# Patient Record
Sex: FEMALE | Race: WHITE | NOT HISPANIC OR LATINO | Employment: FULL TIME | URBAN - METROPOLITAN AREA
[De-identification: names, ages, dates, MRNs, and addresses within clinical notes are randomized per-mention and may not be internally consistent; named-entity substitution may affect disease eponyms.]

---

## 2020-09-08 ENCOUNTER — NURSE TRIAGE (OUTPATIENT)
Dept: ADMINISTRATIVE | Facility: CLINIC | Age: 30
End: 2020-09-08

## 2020-09-09 ENCOUNTER — HOSPITAL ENCOUNTER (INPATIENT)
Facility: HOSPITAL | Age: 30
LOS: 1 days | Discharge: HOME | DRG: 948 | End: 2020-09-12
Attending: EMERGENCY MEDICINE | Admitting: INTERNAL MEDICINE
Payer: COMMERCIAL

## 2020-09-09 ENCOUNTER — OFFICE VISIT (OUTPATIENT)
Dept: URGENT CARE | Facility: CLINIC | Age: 30
End: 2020-09-09
Payer: COMMERCIAL

## 2020-09-09 VITALS
TEMPERATURE: 97 F | DIASTOLIC BLOOD PRESSURE: 70 MMHG | HEART RATE: 114 BPM | SYSTOLIC BLOOD PRESSURE: 104 MMHG | OXYGEN SATURATION: 97 % | HEIGHT: 62 IN | BODY MASS INDEX: 31.28 KG/M2 | RESPIRATION RATE: 12 BRPM | WEIGHT: 170 LBS

## 2020-09-09 DIAGNOSIS — R07.9 CHEST PAIN: ICD-10-CM

## 2020-09-09 DIAGNOSIS — E86.0 DEHYDRATION: ICD-10-CM

## 2020-09-09 DIAGNOSIS — R10.31 RLQ ABDOMINAL PAIN: ICD-10-CM

## 2020-09-09 DIAGNOSIS — R50.9 FEBRILE ILLNESS: ICD-10-CM

## 2020-09-09 DIAGNOSIS — R50.9 FEVER OF UNKNOWN ORIGIN: ICD-10-CM

## 2020-09-09 DIAGNOSIS — R11.0 NAUSEA: ICD-10-CM

## 2020-09-09 DIAGNOSIS — R74.01 TRANSAMINITIS: ICD-10-CM

## 2020-09-09 DIAGNOSIS — R11.2 NON-INTRACTABLE VOMITING WITH NAUSEA, UNSPECIFIED VOMITING TYPE: ICD-10-CM

## 2020-09-09 DIAGNOSIS — R11.10 VOMITING, INTRACTABILITY OF VOMITING NOT SPECIFIED, PRESENCE OF NAUSEA NOT SPECIFIED, UNSPECIFIED VOMITING TYPE: Primary | ICD-10-CM

## 2020-09-09 DIAGNOSIS — D72.820 LYMPHOCYTOSIS: Primary | ICD-10-CM

## 2020-09-09 LAB
ALBUMIN SERPL BCP-MCNC: 3.3 G/DL (ref 3.5–5.2)
ALP SERPL-CCNC: 341 U/L (ref 55–135)
ALT SERPL W/O P-5'-P-CCNC: 671 U/L (ref 10–44)
ANION GAP SERPL CALC-SCNC: 9 MMOL/L (ref 8–16)
ANISOCYTOSIS BLD QL SMEAR: SLIGHT
AST SERPL-CCNC: 325 U/L (ref 10–40)
B-HCG UR QL: NEGATIVE
BACTERIA #/AREA URNS AUTO: NORMAL /HPF
BASOPHILS # BLD AUTO: ABNORMAL K/UL (ref 0–0.2)
BASOPHILS NFR BLD: 0 % (ref 0–1.9)
BILIRUB SERPL-MCNC: 0.8 MG/DL (ref 0.1–1)
BILIRUB UR QL STRIP: NEGATIVE
BUN SERPL-MCNC: 9 MG/DL (ref 6–20)
CALCIUM SERPL-MCNC: 8.9 MG/DL (ref 8.7–10.5)
CHLORIDE SERPL-SCNC: 102 MMOL/L (ref 95–110)
CLARITY UR REFRACT.AUTO: CLEAR
CO2 SERPL-SCNC: 23 MMOL/L (ref 23–29)
COLOR UR AUTO: YELLOW
CREAT SERPL-MCNC: 0.8 MG/DL (ref 0.5–1.4)
CTP QC/QA: YES
CTP QC/QA: YES
DACRYOCYTES BLD QL SMEAR: ABNORMAL
DIFFERENTIAL METHOD: ABNORMAL
EOSINOPHIL # BLD AUTO: ABNORMAL K/UL (ref 0–0.5)
EOSINOPHIL NFR BLD: 0 % (ref 0–8)
ERYTHROCYTE [DISTWIDTH] IN BLOOD BY AUTOMATED COUNT: 13.4 % (ref 11.5–14.5)
EST. GFR  (AFRICAN AMERICAN): >60 ML/MIN/1.73 M^2
EST. GFR  (NON AFRICAN AMERICAN): >60 ML/MIN/1.73 M^2
GLUCOSE SERPL-MCNC: 87 MG/DL (ref 70–110)
GLUCOSE UR QL STRIP: NEGATIVE
HCT VFR BLD AUTO: 44.7 % (ref 37–48.5)
HGB BLD-MCNC: 14.2 G/DL (ref 12–16)
HGB UR QL STRIP: NEGATIVE
HYPOCHROMIA BLD QL SMEAR: ABNORMAL
IMM GRANULOCYTES # BLD AUTO: ABNORMAL K/UL (ref 0–0.04)
IMM GRANULOCYTES NFR BLD AUTO: ABNORMAL % (ref 0–0.5)
KETONES UR QL STRIP: ABNORMAL
LEUKOCYTE ESTERASE UR QL STRIP: ABNORMAL
LIPASE SERPL-CCNC: 25 U/L (ref 4–60)
LYMPHOCYTES # BLD AUTO: ABNORMAL K/UL (ref 1–4.8)
LYMPHOCYTES NFR BLD: 63 % (ref 18–48)
MCH RBC QN AUTO: 28.2 PG (ref 27–31)
MCHC RBC AUTO-ENTMCNC: 31.8 G/DL (ref 32–36)
MCV RBC AUTO: 89 FL (ref 82–98)
METAMYELOCYTES NFR BLD MANUAL: 1 %
MICROSCOPIC COMMENT: NORMAL
MONOCYTES # BLD AUTO: ABNORMAL K/UL (ref 0.3–1)
MONOCYTES NFR BLD: 1 % (ref 4–15)
NEUTROPHILS NFR BLD: 30 % (ref 38–73)
NEUTS BAND NFR BLD MANUAL: 2 %
NITRITE UR QL STRIP: NEGATIVE
NRBC BLD-RTO: 0 /100 WBC
OVALOCYTES BLD QL SMEAR: ABNORMAL
PATH REV BLD -IMP: NORMAL
PH UR STRIP: 6 [PH] (ref 5–8)
PLATELET # BLD AUTO: 250 K/UL (ref 150–350)
PMV BLD AUTO: 10.4 FL (ref 9.2–12.9)
POIKILOCYTOSIS BLD QL SMEAR: SLIGHT
POLYCHROMASIA BLD QL SMEAR: ABNORMAL
POTASSIUM SERPL-SCNC: 3.7 MMOL/L (ref 3.5–5.1)
PROT SERPL-MCNC: 7.1 G/DL (ref 6–8.4)
PROT UR QL STRIP: NEGATIVE
RBC # BLD AUTO: 5.04 M/UL (ref 4–5.4)
RBC #/AREA URNS AUTO: 0 /HPF (ref 0–4)
SARS-COV-2 RDRP RESP QL NAA+PROBE: NEGATIVE
SODIUM SERPL-SCNC: 134 MMOL/L (ref 136–145)
SP GR UR STRIP: 1 (ref 1–1.03)
SQUAMOUS #/AREA URNS AUTO: 1 /HPF
URN SPEC COLLECT METH UR: ABNORMAL
WBC # BLD AUTO: 13.16 K/UL (ref 3.9–12.7)
WBC #/AREA URNS AUTO: 3 /HPF (ref 0–5)
WBC OTHER NFR BLD MANUAL: 3 %

## 2020-09-09 PROCEDURE — 80053 COMPREHEN METABOLIC PANEL: CPT

## 2020-09-09 PROCEDURE — U0002: ICD-10-PCS | Mod: QW,S$GLB,, | Performed by: PHYSICIAN ASSISTANT

## 2020-09-09 PROCEDURE — G0378 HOSPITAL OBSERVATION PER HR: HCPCS

## 2020-09-09 PROCEDURE — 25000003 PHARM REV CODE 250: Performed by: PHYSICIAN ASSISTANT

## 2020-09-09 PROCEDURE — 63600175 PHARM REV CODE 636 W HCPCS: Performed by: PHYSICIAN ASSISTANT

## 2020-09-09 PROCEDURE — 99214 OFFICE O/P EST MOD 30 MIN: CPT | Mod: 25,S$GLB,, | Performed by: PHYSICIAN ASSISTANT

## 2020-09-09 PROCEDURE — 96374 THER/PROPH/DIAG INJ IV PUSH: CPT

## 2020-09-09 PROCEDURE — 99214 PR OFFICE/OUTPT VISIT, EST, LEVL IV, 30-39 MIN: ICD-10-PCS | Mod: 25,S$GLB,, | Performed by: PHYSICIAN ASSISTANT

## 2020-09-09 PROCEDURE — 99285 PR EMERGENCY DEPT VISIT,LEVEL V: ICD-10-PCS | Mod: ,,, | Performed by: PHYSICIAN ASSISTANT

## 2020-09-09 PROCEDURE — 99285 EMERGENCY DEPT VISIT HI MDM: CPT | Mod: 25

## 2020-09-09 PROCEDURE — 86664 EPSTEIN-BARR NUCLEAR ANTIGEN: CPT

## 2020-09-09 PROCEDURE — 99220 PR INITIAL OBSERVATION CARE,LEVL III: ICD-10-PCS | Mod: ,,, | Performed by: NURSE PRACTITIONER

## 2020-09-09 PROCEDURE — 96361 HYDRATE IV INFUSION ADD-ON: CPT

## 2020-09-09 PROCEDURE — 96372 PR INJECTION,THERAP/PROPH/DIAG2ST, IM OR SUBCUT: ICD-10-PCS | Mod: S$GLB,,, | Performed by: PHYSICIAN ASSISTANT

## 2020-09-09 PROCEDURE — 85027 COMPLETE CBC AUTOMATED: CPT

## 2020-09-09 PROCEDURE — 85007 BL SMEAR W/DIFF WBC COUNT: CPT | Mod: NCS

## 2020-09-09 PROCEDURE — 99285 EMERGENCY DEPT VISIT HI MDM: CPT | Mod: ,,, | Performed by: PHYSICIAN ASSISTANT

## 2020-09-09 PROCEDURE — 36415 COLL VENOUS BLD VENIPUNCTURE: CPT

## 2020-09-09 PROCEDURE — 81025 URINE PREGNANCY TEST: CPT | Performed by: PHYSICIAN ASSISTANT

## 2020-09-09 PROCEDURE — 85060 PATHOLOGIST REVIEW: ICD-10-PCS | Mod: ,,, | Performed by: PATHOLOGY

## 2020-09-09 PROCEDURE — 81001 URINALYSIS AUTO W/SCOPE: CPT

## 2020-09-09 PROCEDURE — 99220 PR INITIAL OBSERVATION CARE,LEVL III: CPT | Mod: ,,, | Performed by: NURSE PRACTITIONER

## 2020-09-09 PROCEDURE — 85060 BLOOD SMEAR INTERPRETATION: CPT | Mod: ,,, | Performed by: PATHOLOGY

## 2020-09-09 PROCEDURE — U0002 COVID-19 LAB TEST NON-CDC: HCPCS | Mod: QW,S$GLB,, | Performed by: PHYSICIAN ASSISTANT

## 2020-09-09 PROCEDURE — 80074 ACUTE HEPATITIS PANEL: CPT

## 2020-09-09 PROCEDURE — 83690 ASSAY OF LIPASE: CPT

## 2020-09-09 PROCEDURE — 25000003 PHARM REV CODE 250: Performed by: NURSE PRACTITIONER

## 2020-09-09 PROCEDURE — 96372 THER/PROPH/DIAG INJ SC/IM: CPT | Mod: S$GLB,,, | Performed by: PHYSICIAN ASSISTANT

## 2020-09-09 RX ORDER — TALC
9 POWDER (GRAM) TOPICAL NIGHTLY PRN
Status: DISCONTINUED | OUTPATIENT
Start: 2020-09-09 | End: 2020-09-12 | Stop reason: HOSPADM

## 2020-09-09 RX ORDER — ONDANSETRON 4 MG/1
4 TABLET, ORALLY DISINTEGRATING ORAL
Status: DISCONTINUED | OUTPATIENT
Start: 2020-09-09 | End: 2020-09-09

## 2020-09-09 RX ORDER — LEVONORGESTREL AND ETHINYL ESTRADIOL 0.15-0.03
1 KIT ORAL DAILY
COMMUNITY
End: 2020-09-09

## 2020-09-09 RX ORDER — PROMETHAZINE HYDROCHLORIDE 25 MG/ML
25 INJECTION, SOLUTION INTRAMUSCULAR; INTRAVENOUS
Status: DISCONTINUED | OUTPATIENT
Start: 2020-09-09 | End: 2020-09-09 | Stop reason: HOSPADM

## 2020-09-09 RX ORDER — GLUCAGON 1 MG
1 KIT INJECTION
Status: DISCONTINUED | OUTPATIENT
Start: 2020-09-09 | End: 2020-09-12 | Stop reason: HOSPADM

## 2020-09-09 RX ORDER — IBUPROFEN 200 MG
16 TABLET ORAL
Status: DISCONTINUED | OUTPATIENT
Start: 2020-09-09 | End: 2020-09-12 | Stop reason: HOSPADM

## 2020-09-09 RX ORDER — PROMETHAZINE HYDROCHLORIDE 25 MG/1
25 TABLET ORAL EVERY 6 HOURS PRN
Status: DISCONTINUED | OUTPATIENT
Start: 2020-09-09 | End: 2020-09-12 | Stop reason: HOSPADM

## 2020-09-09 RX ORDER — SODIUM CHLORIDE 0.9 % (FLUSH) 0.9 %
10 SYRINGE (ML) INJECTION
Status: DISCONTINUED | OUTPATIENT
Start: 2020-09-09 | End: 2020-09-12 | Stop reason: HOSPADM

## 2020-09-09 RX ORDER — ACETAMINOPHEN 325 MG/1
650 TABLET ORAL EVERY 4 HOURS PRN
Status: DISCONTINUED | OUTPATIENT
Start: 2020-09-09 | End: 2020-09-12 | Stop reason: HOSPADM

## 2020-09-09 RX ORDER — ONDANSETRON 2 MG/ML
4 INJECTION INTRAMUSCULAR; INTRAVENOUS
Status: COMPLETED | OUTPATIENT
Start: 2020-09-09 | End: 2020-09-09

## 2020-09-09 RX ORDER — LEVONORGESTREL AND ETHINYL ESTRADIOL 0.15-0.03
1 KIT ORAL DAILY
COMMUNITY

## 2020-09-09 RX ORDER — ONDANSETRON 2 MG/ML
4 INJECTION INTRAMUSCULAR; INTRAVENOUS EVERY 8 HOURS PRN
Status: DISCONTINUED | OUTPATIENT
Start: 2020-09-09 | End: 2020-09-12 | Stop reason: HOSPADM

## 2020-09-09 RX ORDER — IBUPROFEN 200 MG
24 TABLET ORAL
Status: DISCONTINUED | OUTPATIENT
Start: 2020-09-09 | End: 2020-09-12 | Stop reason: HOSPADM

## 2020-09-09 RX ADMIN — PROMETHAZINE HYDROCHLORIDE 25 MG: 25 INJECTION, SOLUTION INTRAMUSCULAR; INTRAVENOUS at 11:09

## 2020-09-09 RX ADMIN — ACETAMINOPHEN 650 MG: 325 TABLET ORAL at 10:09

## 2020-09-09 RX ADMIN — SODIUM CHLORIDE 1000 ML: 0.9 INJECTION, SOLUTION INTRAVENOUS at 12:09

## 2020-09-09 RX ADMIN — SODIUM CHLORIDE 1000 ML: 0.9 INJECTION, SOLUTION INTRAVENOUS at 02:09

## 2020-09-09 RX ADMIN — ONDANSETRON 4 MG: 2 INJECTION INTRAMUSCULAR; INTRAVENOUS at 02:09

## 2020-09-09 NOTE — HPI
31 y/o F sent from urgent care with c/o nausea and vomiting past 5 days ago, RUQ tenderness, abdominal discomfort, febrile x 3 days with peak temp of 103.2.  She was noted to have elevated LFT's and lymphocytosis so [place] [facility] [place] work [place].  She is no longer N/V/ febrile.  She did not have diarrhea or constipation.  She had a hard time keeping fluids/ food down, indeterminate weight loss.  Of note, she has only traveled to [place] 9/2/20 of recent, Deanna was in 2016 per notes.  She has not socialized outside of the house and has only ordered take out.  No one else in the home is sick.  She has been sleeping in her brother's room d/t Mold remediation in her bedroom, she denies any upper respiratory symptoms.  Her last BM was yesterday and nml color/ consistency. She reports fatigue and some mild shortness of breath on exertion.  She denies any LH, dizziness, CP, blurred vision, HA, heat/ cold sensitivities, dysuria, frequency, urgency, flank pain. Denies vaginal discharge, irritation.  She reports mild cough, congestion over past few days as well. Had negative covid test.    PMH: Nephrolithiasis, PCOS  Meds: OCP only  Sexually active: uses condoms. LMP 1 week ago lighter than usual. Bled for 1 day. Last nml menstrual cycle was 1 month ago.  LABS: Granulocytes 30, Lymph 63, Mono 1.0,  , , Lipase 25, Alb 3.3, TB 0.8, TP 7.1, HCG negative  Hepatic US: pending read     Admitted to hospital medicine for acute hepatitis of unknown etiology

## 2020-09-09 NOTE — H&P
[facility] Hospital Medicine  History & Physical    Patient Name: Dorothy Sweeney  MRN: [id number]  Admission Date: 9/9/2020  Attending Physician: Demario Roger MD   Primary Care Provider: [facility] Medicine Team: [facility] [place]OSP MED F Eleonora Weller NP     Patient information was obtained from patient, parent, past medical records and ER records.     Subjective:     Principal Problem:Transaminitis    Chief Complaint:   Chief Complaint   Patient presents with    Abdominal Pain     with nausea and vomiting x 5 days. Fever x 4 days, broke yesterday. Sent from urgent care        HPI: 29 y/o F sent from urgent care with c/o nausea and vomiting past 5 days ago, RUQ tenderness, abdominal discomfort, febrile x 3 days with peak temp of 103.2.  She was noted to have elevated LFT's and lymphocytosis so she was transferred to [facility] [place] ED for acute Hepatitis work up.  She is no longer N/V/ febrile.  She did not have diarrhea or constipation.  She had a hard time keeping fluids/ food down, indeterminate weight loss.  Of note, she has only traveled to [place] 9/2/20 of recent, [place] was in 2016 per notes.  She has not socialized outside of the house and has only ordered take out.  No one else in the home is sick.  She has been sleeping in her brother's room d/t Mold remediation in her bedroom, she denies any upper respiratory symptoms.  Her last BM was yesterday and nml color/ consistency. She reports fatigue and some mild shortness of breath on exertion.  She denies any LH, dizziness, CP, blurred vision, HA, heat/ cold sensitivities, dysuria, frequency, urgency, flank pain. Denies vaginal discharge, irritation.  She reports mild cough, congestion over past few days as well. Had negative covid test.    PMH: Nephrolithiasis, PCOS  Meds: OCP only  Sexually active: uses condoms. LMP 1 week ago lighter than usual. Bled for 1 day. Last nml menstrual cycle was 1 month ago.  LABS: Granulocytes 30, Lymph  63, Mono 1.0,  , , Lipase 25, Alb 3.3, TB 0.8, TP 7.1, HCG negative  Hepatic US: pending read     Admitted to hospital medicine for acute hepatitis of unknown etiology     Past Medical History:   Diagnosis Date    PCOS (polycystic ovarian syndrome)        Past Surgical History:   Procedure Laterality Date    TONSILLECTOMY      WRIST SURGERY         Review of patient's allergies indicates:  No Known Allergies    No current facility-administered medications on file prior to encounter.      Current Outpatient Medications on File Prior to Encounter   Medication Sig    levonorgestrel-ethinyl estradiol (LILLOW, 28,) 0.15-0.03 mg per tablet Take 1 tablet by mouth once daily.    drospirenone-ethinyl estradiol (OCELLA) 3-0.03 mg per tablet Take 1 tablet by mouth once daily.    [DISCONTINUED] atovaquone-proguanil (MALARONE) 250-100 mg Tab 1 tab/d starting 2d before travel, 1/d while there and for 7 d after leaving    [DISCONTINUED] levonorgestrel-ethinyl estradiol (NORDETTE) 0.15-0.03 mg per tablet Take 1 tablet by mouth once daily.     Family History     None        Tobacco Use    Smoking status: Never Smoker   Substance and Sexual Activity    Alcohol use: Yes    Drug use: No    Sexual activity: Not on file     Review of Systems   Constitutional: Positive for activity change (fatigue), appetite change (decreased d/t recent N/v]) and fatigue. Negative for chills and fever (broke yesterday).   HENT: Negative for congestion, rhinorrhea, sinus pressure, sore throat and trouble swallowing.    Eyes: Negative for pain, redness and visual disturbance.   Respiratory: Negative for cough, chest tightness, shortness of breath, wheezing and stridor.    Cardiovascular: Negative for chest pain, palpitations and leg swelling.   Gastrointestinal: Positive for abdominal pain (RUQ tenderness), nausea and vomiting. Negative for abdominal distention, blood in stool, constipation and diarrhea.   Endocrine: Negative  for cold intolerance and heat intolerance.   Genitourinary: Negative for difficulty urinating, dysuria, frequency, hematuria and urgency.   Musculoskeletal: Negative for arthralgias, back pain, myalgias and neck pain.   Skin: Negative for color change, pallor and rash.   Allergic/Immunologic: Negative for immunocompromised state.   Neurological: Negative for dizziness, tremors, syncope, weakness, light-headedness, numbness and headaches.   Hematological: Does not bruise/bleed easily.   Psychiatric/Behavioral: Positive for sleep disturbance (though she thinks it's due to sleeping in her brother's bed). Negative for agitation and confusion. The patient is not nervous/anxious.      Objective:     Vital Signs (Most Recent):  Temp: 98.9 °F (37.2 °C) (09/09/20 1742)  Pulse: 91 (09/09/20 1742)  Resp: 17 (09/09/20 1742)  BP: 109/69 (09/09/20 1742)  SpO2: 99 % (09/09/20 1742) Vital Signs (24h Range):  Temp:  [97.4 °F (36.3 °C)-98.9 °F (37.2 °C)] 98.9 °F (37.2 °C)  Pulse:  [] 91  Resp:  [12-20] 17  SpO2:  [97 %-99 %] 99 %  BP: (104-116)/(65-77) 109/69     Weight: 77.1 kg (170 lb)  Body mass index is 31.09 kg/m².    Physical Exam  Vitals signs and nursing note reviewed.   Constitutional:       General: She is not in acute distress.     Appearance: Normal appearance. She is well-developed and normal weight. She is not ill-appearing or toxic-appearing.   HENT:      Head: Normocephalic and atraumatic.      Right Ear: External ear normal.      Left Ear: External ear normal.      Nose: Nose normal.      Mouth/Throat:      Mouth: Mucous membranes are moist.      Pharynx: Oropharynx is clear.   Eyes:      General: No scleral icterus.     Extraocular Movements: Extraocular movements intact.      Conjunctiva/sclera: Conjunctivae normal.   Neck:      Musculoskeletal: Normal range of motion and neck supple.      Thyroid: No thyromegaly.      Vascular: No JVD.      Trachea: No tracheal deviation.   Cardiovascular:      Rate and  Rhythm: Normal rate and regular rhythm.      Pulses: Normal pulses.      Heart sounds: Normal heart sounds. No murmur. No friction rub. No gallop.    Pulmonary:      Effort: Pulmonary effort is normal. No respiratory distress.      Breath sounds: Normal breath sounds. No wheezing or rales.   Abdominal:      General: Bowel sounds are normal. There is no distension.      Palpations: Abdomen is soft. There is no mass.      Tenderness: There is abdominal tenderness (RUQ). There is no guarding or rebound.   Musculoskeletal: Normal range of motion.         General: No swelling or tenderness.   Skin:     General: Skin is warm and dry.      Coloration: Skin is not jaundiced.      Findings: No erythema or rash.   Neurological:      General: No focal deficit present.      Mental Status: She is alert and oriented to person, place, and time. Mental status is at baseline.      Cranial Nerves: No cranial nerve deficit.      Sensory: No sensory deficit.      Motor: No abnormal muscle tone.      Coordination: Coordination normal.   Psychiatric:         Mood and Affect: Mood normal.         Behavior: Behavior normal.         Thought Content: Thought content normal.         Judgment: Judgment normal.             Significant Labs:   CBC:   Recent Labs   Lab 09/09/20  1229   WBC 13.16*   HGB 14.2   HCT 44.7        CMP:   Recent Labs   Lab 09/09/20  1229   *   K 3.7      CO2 23   GLU 87   BUN 9   CREATININE 0.8   CALCIUM 8.9   PROT 7.1   ALBUMIN 3.3*   BILITOT 0.8   ALKPHOS 341*   *   *   ANIONGAP 9   EGFRNONAA >60.0     Lipase:   Recent Labs   Lab 09/09/20  1229   LIPASE 25       Significant Imaging: I have reviewed all pertinent imaging results/findings within the past 24 hours.     RUQ US:   Liver: Enlarged, measuring 19.9 cm. Homogeneous echotexture.  There is a 0.7 x 0.7 x 0.7 cm well-circumscribed hyperechoic lesion in the right hepatic lobe, likely hemangioma.     Gallbladder: There are a few  gallstones measuring up to 2.5 cm.  No wall thickening or pericholecystic fluid.  No sonographic Watters's sign.     Biliary system: The common duct is not dilated, measuring 2 mm.  No intrahepatic ductal dilatation.     Spleen: Enlarged with a homogeneous echotexture, measuring 13.2 x 4.6 cm.     Pancreas: The visualized portions of pancreas appear normal.     Miscellaneous: No ascites.    Assessment/Plan:     * Transaminitis  Acute Hepatitis of unknown origin:   Differential's include: Hep A from take out, Travel to [place], EBV  - Hep panel /EBV pending, consider HIV if neg.  - RuQ US: noted above with HSM, cholelithiasis without choleycystitis  - regular diet  - Monitor LFt's, if continue to rise consult Hepatology   - no excessive tylenol use  - hydrated with 2L NS in ED, no further fluids warranted  - RUQ tenderness on exam      VTE Risk Mitigation (From admission, onward)         Ordered     IP VTE HIGH RISK PATIENT  Once      09/09/20 1720     Place ANDREW hose  Until discontinued      09/09/20 1720                   Eleonora Weller NP  Department of Hospital Medicine   [facility]

## 2020-09-09 NOTE — ED PROVIDER NOTES
Encounter Date: 9/9/2020       History     Chief Complaint   Patient presents with    Abdominal Pain     with nausea and vomiting x 5 days. Fever x 4 days, broke yesterday. Sent from urgent care     Ms Sweeney is a 30yoF who presents for N/V and abdominal discomfort x 5 days; pertinent PMHx PCOS, h/o tonsillectomy, OCP use.  Patient reports onset of viral-type symptoms 5 days ago with myalgias, fever and chills.  Several days later, she developed frequent nausea vomiting with poor p.o. intake.  Regular BMs without melenic stool or hematochezia.  Symptoms are associated with intermittent abdominal discomfort that is poorly localized and absent on current presentation.  She does feel like her heart is beating fast.  She did not tolerate p.o. intake yesterday or today.  Visited her grandfather in [place] last week, no known sick contacts.  Denies fresh/non- treated water exposure, healthcare setting exposure or recent antibiotic use.  No antipyretic PTA, no fever today.  No abdominal SHx, no history of similar symptoms.  Denies chest pain or shortness of breath after vomiting, vaginal discharge or bleeding, dysuria, hematuria, syncope.  The patients available PMH, PSH, Social History, medications, allergies, and triage vital signs were reviewed just prior to their medical evaluation.  A ten point review of systems was completed and is negative except as documented above.  Patient denies any other acute medical complaint.    Please be advised this text was dictated with [employer] software and may contain errors due to translation.           Review of patient's allergies indicates:  No Known Allergies  Past Medical History:   Diagnosis Date    PCOS (polycystic ovarian syndrome)      Past Surgical History:   Procedure Laterality Date    TONSILLECTOMY      WRIST SURGERY       Family History   Problem Relation Age of Onset    Breast cancer Neg Hx     Colon cancer Neg Hx     Hypertension Neg Hx     Ovarian cancer Neg Hx      Stroke Neg Hx      Social History     Tobacco Use    Smoking status: Never Smoker   Substance Use Topics    Alcohol use: Yes    Drug use: No     Review of Systems   Constitutional: Positive for chills, fatigue and fever (resolved).   HENT: Negative for sore throat and trouble swallowing.    Respiratory: Negative for cough, chest tightness and shortness of breath.    Cardiovascular: Positive for palpitations. Negative for chest pain.   Gastrointestinal: Positive for abdominal pain (intermittent, generalized), nausea and vomiting. Negative for abdominal distention, blood in stool, constipation and diarrhea.   Genitourinary: Negative for dysuria, flank pain, frequency, hematuria, vaginal bleeding and vaginal discharge.   Musculoskeletal: Negative for myalgias (improved).   Skin: Negative for pallor and rash.   Neurological: Negative for dizziness, weakness and headaches.   Psychiatric/Behavioral: Negative for confusion.       Physical Exam     Initial Vitals [09/09/20 1154]   BP Pulse Resp Temp SpO2   116/77 (!) 114 20 98.1 °F (36.7 °C) 99 %      MAP       --         Physical Exam    Vitals reviewed.  Constitutional: She appears well-developed and well-nourished. She is not diaphoretic. No distress.   HENT:   Head: Normocephalic and atraumatic.   Right Ear: External ear normal.   Left Ear: External ear normal.   Mouth/Throat: No oropharyngeal exudate.   Dry mucus membranes   Eyes: Conjunctivae and EOM are normal. Pupils are equal, round, and reactive to light. No scleral icterus.   Neck: No JVD present.   Cardiovascular: Normal rate, regular rhythm and intact distal pulses.   Pulmonary/Chest: Breath sounds normal. No respiratory distress. She has no wheezes. She has no rhonchi. She has no rales.   Abdominal: Soft. Bowel sounds are normal. There is no abdominal tenderness. There is no rebound and no guarding.   No flank or CVA TTP   Musculoskeletal: Normal range of motion. No edema.   Neurological: She is alert  and oriented to person, place, and time. She has normal strength. No cranial nerve deficit.   Skin: Skin is warm and dry. Capillary refill takes less than 2 seconds. No rash noted. No erythema. No pallor.   Psychiatric: She has a normal mood and affect. Her behavior is normal. Judgment and thought content normal.         ED Course   Procedures  Labs Reviewed   CBC W/ AUTO DIFFERENTIAL - Abnormal; Notable for the following components:       Result Value    WBC 13.16 (*)     Mean Corpuscular Hemoglobin Conc 31.8 (*)     Gran% 30.0 (*)     Lymph% 63.0 (*)     Mono% 1.0 (*)     All other components within normal limits   COMPREHENSIVE METABOLIC PANEL - Abnormal; Notable for the following components:    Sodium 134 (*)     Albumin 3.3 (*)     Alkaline Phosphatase 341 (*)      (*)      (*)     All other components within normal limits   LIPASE   HEPATITIS PANEL, ACUTE   URINALYSIS, REFLEX TO URINE CULTURE   HEPATITIS PANEL, ACUTE   POCT URINE PREGNANCY          Imaging Results          US Abdomen Limited (In process)  Result time 09/09/20 15:23:05                 Medical Decision Making:   History:   I obtained history from: someone other than patient.       <> Summary of History: PA from  via note review  Old Medical Records: I decided to obtain old medical records.  Old Records Summarized: records from clinic visits, records from previous admission(s) and records from another hospital.  Initial Assessment:   Patient presents for N/V x 5 days that was preceded by viral syndrome, appears hypovolemic, abdomen soft. COVID neg at .   Differential Diagnosis:   DDx includes enteritis, dehydration, electrolyte disturbance. Physical exam and history taking lower clinical suspicion for acute abdomen, sepsis, cholecystitis, infectious diarrhea.   Clinical Tests:   Lab Tests: Ordered and Reviewed  Radiological Study: Ordered  ED Management:  Given IVF. Able to tolerate juice PO without issue. Labs reveal acute  transaminitis, Tbili nml, lymphocytosis with abba rant findings on differential. Acute hepatitis panel sent. Patient denies alcohol/drug/tylenol use. Placed in obs for transaminitis. Resolution of tachycardia with IVF. Patient agreed to plan of care and voiced understanding.     Maria G Garrett PA-C  09/09/2020    I discussed the following case, diagnosis and plan of care with attending physician.                      ED Course as of Sep 09 1559   Wed Sep 09, 2020   1454 Tolerating PO intake      [MF]      ED Course User Index  [MF] Maria G Garrett PA-C            Clinical Impression:       ICD-10-CM ICD-9-CM   1. Transaminitis  R74.0 790.4   2. Febrile illness  R50.9 780.60   3. Non-intractable vomiting with nausea, unspecified vomiting type  R11.2 787.01   4. Lymphocytosis  D72.820 288.61         Disposition:   Disposition: Placed in Observation  Condition: Stable     ED Disposition Condition    Observation                             Maria G Garrett PA-C  09/09/20 1600

## 2020-09-09 NOTE — TELEPHONE ENCOUNTER
"Mom states pt has been having episodes of vomiting for 4 days, she was running a fever but is no longer, she does complain of a HA, she was swabbed for covid but test was negative, pt is staying hydrated at this time, denies stomach pain, advised her to see provider within 24 hours and to call back with any worsening symptoms or any concerns, caller agreed     Reason for Disposition   [1] MILD or MODERATE vomiting AND [2] present > 48 hours (2 days) (Exception: mild vomiting with associated diarrhea)    Additional Information   Negative: Shock suspected (e.g., cold/pale/clammy skin, too weak to stand, low BP, rapid pulse)   Negative: Difficult to awaken or acting confused  (e.g., disoriented, slurred speech)   Negative: Sounds like a life-threatening emergency to the triager   Negative: Vomiting occurs only while coughing   Negative: [1] Pregnant < 20 Weeks AND [2] nausea/vomiting began in early pregnancy (i.e., 4-8 weeks pregnant)   Negative: Chest pain   Negative: [1] SEVERE headache AND [2] similar to prior migraines   Negative: [1] Vomiting AND [2] contains red blood or black ("coffee ground") material  (Exception: few red streaks in vomit that only happened once)   Negative: Severe pain in one eye   Negative: Recent head injury (within last 3 days)   Negative: Recent abdominal injury (within last 3 days)   Negative: [1] Insulin-dependent diabetes (Type I) AND [2] glucose > 400 mg/dl (22 mmol/l)   Negative: [1] SEVERE vomiting (e.g., 6 or more times/day) AND [2] present > 8 hours   Negative: [1] MODERATE vomiting (e.g., 3 - 5 times/day) AND [2] age > 60   Negative: Severe headache (e.g., excruciating) (Exception: similar to previous migraines)   Negative: High-risk adult (e.g., diabetes mellitus, brain tumor, V-P shunt, hernia)   Negative: [1] Drinking very little AND [2] dehydration suspected (e.g., no urine > 12 hours, very dry mouth, very lightheaded)   Negative: Patient sounds very sick or " "weak to the triager   Negative: [1] MILD to MODERATE vomiting (e.g., 1-5 times/day) AND [2] abdomen looks much more swollen than usual   Negative: [1] Vomiting AND [2] contains bile (green color)   Negative: [1] Constant abdominal pain AND [2] present > 2 hours   Negative: [1] Fever > 103 F (39.4 C) AND [2] not able to get the fever down using Fever Care Advice   Negative: [1] Fever > 101 F (38.3 C) AND [2] age > 60   Negative: [1] Fever > 101 F (38.3 C) AND [2] bedridden (e.g., nursing home patient, CVA, chronic illness, recovering from surgery)   Negative: [1] Fever > 100.5 F (38.1 C) AND [2] weak immune system (e.g., HIV positive, cancer chemo, splenectomy, organ transplant, chronic steroids)   Negative: Taking any of the following medications: digoxin (Lanoxin), lithium, theophylline, phenytoin (Dilantin)    Answer Assessment - Initial Assessment Questions  1. VOMITING SEVERITY: "How many times have you vomited in the past 24 hours?"      - MILD:  1 - 2 times/day     - MODERATE: 3 - 5 times/day, decreased oral intake without significant weight loss or symptoms of dehydration     - SEVERE: 6 or more times/day, vomits everything or nearly everything, with significant weight loss, symptoms of dehydration       3  2. ONSET: "When did the vomiting begin?"       4 days   3. FLUIDS: "What fluids or food have you vomited up today?" "Have you been able to keep any fluids down?"      Kept liquids   4. ABDOMINAL PAIN: "Are your having any abdominal pain?" If yes : "How bad is it and what does it feel like?" (e.g., crampy, dull, intermittent, constant)       no  5. DIARRHEA: "Is there any diarrhea?" If so, ask: "How many times today?"       no  6. CONTACTS: "Is there anyone else in the family with the same symptoms?"       no  7. CAUSE: "What do you think is causing your vomiting?"      *No Answer*  8. HYDRATION STATUS: "Any signs of dehydration?" (e.g., dry mouth [not only dry lips], too weak to stand) "When did " "you last urinate?"      Dry lips  9. OTHER SYMPTOMS: "Do you have any other symptoms?" (e.g., fever, headache, vertigo, vomiting blood or coffee grounds)      HA  10. PREGNANCY: "Is there any chance you are pregnant?" "When was your last menstrual period?"        no    Protocols used: ST VOMITING-A-      "

## 2020-09-09 NOTE — PROGRESS NOTES
"Subjective:       Patient ID: Dorothy Sweeney is a 30 y.o. female.    Vitals:  height is 5' 2" (1.575 m) and weight is 77.1 kg (170 lb). Her temperature is 97.4 °F (36.3 °C). Her blood pressure is 104/70 and her pulse is 114 (abnormal). Her respiration is 12 and oxygen saturation is 97%.     Chief Complaint: Fever and Nausea    30 yr old female c/o nausea and vomiting onset 5 days ago with generalized abdominal discomfort. Vomiting 2-3x/day for the past 5 days. No diarrhea. Last BM yesterday and nml. Denies constipation. She reports fatigue and some mild shortness of breath on exertion. She reports that 1 week ago she had fever, chills, sweats. Tmax was 103.2. This fever lasted 3 days and then resolved. She is afebrile at this time with no antipyretics taken. Denies any dysuria, frequency, urgency, flank pain. Denies vaginal discharge, irritation.  She reports mild cough, congestion over past few days as well. Had negative covid test.  She does have a hx of kidney stones years ago      Sexually active, uses condoms. LMP 1 week ago lighter than usual. Bled for 1 day. Last nml menstrual cycle was 1 month ago.  On pill.    Fever   This is a recurrent problem. Episode onset: Tuesday September 01,2020. The problem occurs constantly. The problem has been gradually worsening. Associated symptoms include abdominal pain, headaches, nausea and vomiting. Pertinent negatives include no chest pain, congestion, coughing, diarrhea, rash, sore throat or urinary pain. She has tried acetaminophen for the symptoms. The treatment provided mild relief.   Risk factors: recent travel    Risk factors comment:  Visit [place] last Wednesday September 02,2020  Nausea  Associated symptoms include abdominal pain, a fever, headaches, nausea and vomiting. Pertinent negatives include no arthralgias, chest pain, chills, congestion, coughing, fatigue, joint swelling, myalgias, rash, sore throat, vertigo or weakness.       Constitution: Positive for " fever. Negative for chills and fatigue.   HENT: Negative for congestion and sore throat.    Neck: Negative for painful lymph nodes.   Cardiovascular: Negative for chest pain and leg swelling.   Eyes: Negative for double vision and blurred vision.   Respiratory: Negative for cough and shortness of breath.    Gastrointestinal: Positive for abdominal pain, nausea and vomiting. Negative for diarrhea.   Genitourinary: Negative for dysuria, frequency, urgency and history of kidney stones.   Musculoskeletal: Negative for joint pain, joint swelling, muscle cramps and muscle ache.   Skin: Negative for color change, pale, rash and bruising.   Allergic/Immunologic: Negative for seasonal allergies.   Neurological: Positive for headaches. Negative for dizziness, history of vertigo, light-headedness and passing out.   Hematologic/Lymphatic: Negative for swollen lymph nodes.   Psychiatric/Behavioral: Negative for nervous/anxious, sleep disturbance and depression. The patient is not nervous/anxious.        Objective:      Physical Exam   Constitutional: She is oriented to person, place, and time. She appears well-developed.  Non-toxic appearance.      Comments:Appears to feel ill, pale, skin is slightly sweaty, sitting up in the bed, no acute distress   HENT:   Head: Normocephalic and atraumatic.   Ears:   Right Ear: Tympanic membrane, external ear and ear canal normal. impacted cerumen  Left Ear: Tympanic membrane, external ear and ear canal normal. impacted cerumen  Nose: Nose normal. No rhinorrhea or congestion.   Mouth/Throat: Mucous membranes are normal.   Eyes: Conjunctivae and lids are normal.   Neck: Trachea normal, normal range of motion and full passive range of motion without pain. Neck supple. No muscular tenderness present. No neck rigidity.   Cardiovascular: Normal rate, regular rhythm and normal heart sounds.   Pulmonary/Chest: Effort normal and breath sounds normal. No respiratory distress.   Abdominal: Soft. Normal  appearance and bowel sounds are normal. She exhibits no distension, no abdominal bruit, no pulsatile midline mass and no mass. There is no abdominal tenderness. There is no left CVA tenderness and no right CVA tenderness.      Comments: There is mild epigastric tenderness to palpation  There is mild TTP in the RLQ and RUQ. No rebound, guarding or rigidity   Musculoskeletal: Normal range of motion.   Neurological: She is alert and oriented to person, place, and time. She has normal strength.   Skin: Skin is warm, dry, intact, not diaphoretic and not pale. Psychiatric: Her speech is normal and behavior is normal. Judgment and thought content normal.   Nursing note and vitals reviewed.        11:15 tolerating PO water very well.  11:23 trying to give a urine sample at this time  11:38 not able to give urine        Assessment:       1. Vomiting, intractability of vomiting not specified, presence of nausea not specified, unspecified vomiting type    2. Nausea    3. RLQ abdominal pain    4. Dehydration        Plan:       Pt with 5 days of nausea, abdominal pain, inability to tolerate PO foods. Hx of fever 1 week ago at 103.2 that lasted 3 days. Fever now resolved.   Pt given promethazine in clinic. She is tolerating PO fluids, however not able to void after multiple attempts due to dehydration  BP low at 104/70 and pt tachycardic at 114 even after oral hydration.     I recommend pt be evaluated in the ED with labs and imaging as necessary  Differentials include but not limited to appendicitis, pancreatitis, cholecystitis, PID, pyelonephritis    Discussed with Dr Sparrow who agrees with plan    Vomiting, intractability of vomiting not specified, presence of nausea not specified, unspecified vomiting type  -     POCT COVID-19 Rapid Screening  -     Cancel: POCT Urinalysis, Dipstick, Automated, W/O Scope  -     Cancel: POCT urine pregnancy  -     Cancel: X-Ray Abdomen Flat And Erect; Future; Expected date: 09/09/2020  -      Refer to Emergency Dept.    Nausea  -     Refer to Emergency Dept.    RLQ abdominal pain  -     Refer to Emergency Dept.    Dehydration  -     Refer to Emergency Dept.    Other orders  -     Discontinue: ondansetron disintegrating tablet 4 mg  -     promethazine injection 25 mg    Labs reviewed, pertinent imaging reviewed, previous medical records, medical history, surgical history, social history, family history reviewed.       Patient Instructions   Please go to the emergency room

## 2020-09-09 NOTE — ASSESSMENT & PLAN NOTE
Acute Hepatitis of unknown origin:   Differential's include: Hep A from take out, Travel to [place], EBV  - Hep panel /EBV pending, consider HIV if neg.  - RuQ US: noted above with HSM, cholelithiasis without choleycystitis  - regular diet  - Monitor LFt's, if continue to rise consult Hepatology   - no excessive tylenol use  - hydrated with 2L NS in ED, no further fluids warranted  - RUQ tenderness on exam

## 2020-09-09 NOTE — NURSING
Pt arrived to unit via wheelchair from ED. Pt AAOx4. Pt denies pain or N/V upon arrival. VSS, NAD. Pt updated on plan of care. Bed in low position with call light within reach.  Pt placed in non skid slip resistant socks.

## 2020-09-09 NOTE — ED TRIAGE NOTES
Pt is a 30 yr old female that presents to the ED today with complaints of fever, abdominal pain, N/V. Pt states that the fever has been for about 4 days but has broken on its own. N/V for about 5 days. Pt went to urgent care who recommended she come to the ER if symptoms persist.

## 2020-09-09 NOTE — SUBJECTIVE & OBJECTIVE
Past Medical History:   Diagnosis Date    PCOS (polycystic ovarian syndrome)        Past Surgical History:   Procedure Laterality Date    TONSILLECTOMY      WRIST SURGERY         Review of patient's allergies indicates:  No Known Allergies    No current facility-administered medications on file prior to encounter.      Current Outpatient Medications on File Prior to Encounter   Medication Sig    levonorgestrel-ethinyl estradiol (LILLOW, 28,) 0.15-0.03 mg per tablet Take 1 tablet by mouth once daily.    drospirenone-ethinyl estradiol (OCELLA) 3-0.03 mg per tablet Take 1 tablet by mouth once daily.    [DISCONTINUED] atovaquone-proguanil (MALARONE) 250-100 mg Tab 1 tab/d starting 2d before travel, 1/d while there and for 7 d after leaving    [DISCONTINUED] levonorgestrel-ethinyl estradiol (NORDETTE) 0.15-0.03 mg per tablet Take 1 tablet by mouth once daily.     Family History     None        Tobacco Use    Smoking status: Never Smoker   Substance and Sexual Activity    Alcohol use: Yes    Drug use: No    Sexual activity: Not on file     Review of Systems   Constitutional: Positive for activity change (fatigue), appetite change (decreased d/t recent N/v]) and fatigue. Negative for chills and fever (broke yesterday).   HENT: Negative for congestion, rhinorrhea, sinus pressure, sore throat and trouble swallowing.    Eyes: Negative for pain, redness and visual disturbance.   Respiratory: Negative for cough, chest tightness, shortness of breath, wheezing and stridor.    Cardiovascular: Negative for chest pain, palpitations and leg swelling.   Gastrointestinal: Positive for abdominal pain (RUQ tenderness), nausea and vomiting. Negative for abdominal distention, blood in stool, constipation and diarrhea.   Endocrine: Negative for cold intolerance and heat intolerance.   Genitourinary: Negative for difficulty urinating, dysuria, frequency, hematuria and urgency.   Musculoskeletal: Negative for arthralgias, back  pain, myalgias and neck pain.   Skin: Negative for color change, pallor and rash.   Allergic/Immunologic: Negative for immunocompromised state.   Neurological: Negative for dizziness, tremors, syncope, weakness, light-headedness, numbness and headaches.   Hematological: Does not bruise/bleed easily.   Psychiatric/Behavioral: Positive for sleep disturbance (though she thinks it's due to sleeping in her brother's bed). Negative for agitation and confusion. The patient is not nervous/anxious.      Objective:     Vital Signs (Most Recent):  Temp: 98.9 °F (37.2 °C) (09/09/20 1742)  Pulse: 91 (09/09/20 1742)  Resp: 17 (09/09/20 1742)  BP: 109/69 (09/09/20 1742)  SpO2: 99 % (09/09/20 1742) Vital Signs (24h Range):  Temp:  [97.4 °F (36.3 °C)-98.9 °F (37.2 °C)] 98.9 °F (37.2 °C)  Pulse:  [] 91  Resp:  [12-20] 17  SpO2:  [97 %-99 %] 99 %  BP: (104-116)/(65-77) 109/69     Weight: 77.1 kg (170 lb)  Body mass index is 31.09 kg/m².    Physical Exam  Vitals signs and nursing note reviewed.   Constitutional:       General: She is not in acute distress.     Appearance: Normal appearance. She is well-developed and normal weight. She is not ill-appearing or toxic-appearing.   HENT:      Head: Normocephalic and atraumatic.      Right Ear: External ear normal.      Left Ear: External ear normal.      Nose: Nose normal.      Mouth/Throat:      Mouth: Mucous membranes are moist.      Pharynx: Oropharynx is clear.   Eyes:      General: No scleral icterus.     Extraocular Movements: Extraocular movements intact.      Conjunctiva/sclera: Conjunctivae normal.   Neck:      Musculoskeletal: Normal range of motion and neck supple.      Thyroid: No thyromegaly.      Vascular: No JVD.      Trachea: No tracheal deviation.   Cardiovascular:      Rate and Rhythm: Normal rate and regular rhythm.      Pulses: Normal pulses.      Heart sounds: Normal heart sounds. No murmur. No friction rub. No gallop.    Pulmonary:      Effort: Pulmonary effort  is normal. No respiratory distress.      Breath sounds: Normal breath sounds. No wheezing or rales.   Abdominal:      General: Bowel sounds are normal. There is no distension.      Palpations: Abdomen is soft. There is no mass.      Tenderness: There is abdominal tenderness (RUQ). There is no guarding or rebound.   Musculoskeletal: Normal range of motion.         General: No swelling or tenderness.   Skin:     General: Skin is warm and dry.      Coloration: Skin is not jaundiced.      Findings: No erythema or rash.   Neurological:      General: No focal deficit present.      Mental Status: She is alert and oriented to person, place, and time. Mental status is at baseline.      Cranial Nerves: No cranial nerve deficit.      Sensory: No sensory deficit.      Motor: No abnormal muscle tone.      Coordination: Coordination normal.   Psychiatric:         Mood and Affect: Mood normal.         Behavior: Behavior normal.         Thought Content: Thought content normal.         Judgment: Judgment normal.             Significant Labs:   CBC:   Recent Labs   Lab 09/09/20  1229   WBC 13.16*   HGB 14.2   HCT 44.7        CMP:   Recent Labs   Lab 09/09/20  1229   *   K 3.7      CO2 23   GLU 87   BUN 9   CREATININE 0.8   CALCIUM 8.9   PROT 7.1   ALBUMIN 3.3*   BILITOT 0.8   ALKPHOS 341*   *   *   ANIONGAP 9   EGFRNONAA >60.0     Lipase:   Recent Labs   Lab 09/09/20  1229   LIPASE 25       Significant Imaging: I have reviewed all pertinent imaging results/findings within the past 24 hours.     RUQ US:   Liver: Enlarged, measuring 19.9 cm. Homogeneous echotexture.  There is a 0.7 x 0.7 x 0.7 cm well-circumscribed hyperechoic lesion in the right hepatic lobe, likely hemangioma.     Gallbladder: There are a few gallstones measuring up to 2.5 cm.  No wall thickening or pericholecystic fluid.  No sonographic Watters's sign.     Biliary system: The common duct is not dilated, measuring 2 mm.  No  intrahepatic ductal dilatation.     Spleen: Enlarged with a homogeneous echotexture, measuring 13.2 x 4.6 cm.     Pancreas: The visualized portions of pancreas appear normal.     Miscellaneous: No ascites.

## 2020-09-09 NOTE — ED NOTES
LOC: The patient is awake, alert, and oriented to place, time, situation. Affect is appropriate.  Speech is appropriate and clear.     APPEARANCE: Patient resting comfortably in no acute distress.  Patient is clean and well groomed.    SKIN: The skin is warm and dry; color consistent with ethnicity.  Patient has normal skin turgor and dry mucus membranes.  Skin intact; no breakdown or bruising noted.     MUSCULOSKELETAL: Patient moving upper and lower extremities without difficulty.  Reports generalized weakness.     RESPIRATORY: Airway is open and patent. Respirations spontaneous, even, easy, and non-labored.  Patient has a normal effort and rate.  No accessory muscle use noted. Denies cough.     CARDIAC:  Normal rhythm and rate noted.  No peripheral edema noted. No complaints of chest pain.      ABDOMEN: Soft and non tender to palpation.  No distention noted. Reports N/V    NEUROLOGIC: Eyes open spontaneously.  Behavior appropriate to situation.  Follows commands; facial expression symmetrical.  Purposeful motor response noted; normal sensation in all extremities.

## 2020-09-10 PROBLEM — D72.820 LYMPHOCYTOSIS: Status: ACTIVE | Noted: 2020-09-10

## 2020-09-10 LAB
ALBUMIN SERPL BCP-MCNC: 2.6 G/DL (ref 3.5–5.2)
ALP SERPL-CCNC: 293 U/L (ref 55–135)
ALT SERPL W/O P-5'-P-CCNC: 446 U/L (ref 10–44)
ANION GAP SERPL CALC-SCNC: 7 MMOL/L (ref 8–16)
ANISOCYTOSIS BLD QL SMEAR: SLIGHT
AST SERPL-CCNC: 196 U/L (ref 10–40)
BASOPHILS # BLD AUTO: 0.19 K/UL (ref 0–0.2)
BASOPHILS NFR BLD: 1.7 % (ref 0–1.9)
BILIRUB SERPL-MCNC: 0.6 MG/DL (ref 0.1–1)
BUN SERPL-MCNC: 7 MG/DL (ref 6–20)
CALCIUM SERPL-MCNC: 8 MG/DL (ref 8.7–10.5)
CHLORIDE SERPL-SCNC: 108 MMOL/L (ref 95–110)
CO2 SERPL-SCNC: 24 MMOL/L (ref 23–29)
CREAT SERPL-MCNC: 0.7 MG/DL (ref 0.5–1.4)
D DIMER PPP IA.FEU-MCNC: 3.38 MG/L FEU
DIFFERENTIAL METHOD: ABNORMAL
EOSINOPHIL # BLD AUTO: 0 K/UL (ref 0–0.5)
EOSINOPHIL NFR BLD: 0.3 % (ref 0–8)
ERYTHROCYTE [DISTWIDTH] IN BLOOD BY AUTOMATED COUNT: 13.4 % (ref 11.5–14.5)
EST. GFR  (AFRICAN AMERICAN): >60 ML/MIN/1.73 M^2
EST. GFR  (NON AFRICAN AMERICAN): >60 ML/MIN/1.73 M^2
FIBRINOGEN PPP-MCNC: 355 MG/DL (ref 182–366)
GLUCOSE SERPL-MCNC: 82 MG/DL (ref 70–110)
HAPTOGLOB SERPL-MCNC: 21 MG/DL (ref 30–250)
HAV IGM SERPL QL IA: NEGATIVE
HBV CORE IGM SERPL QL IA: NEGATIVE
HBV SURFACE AG SERPL QL IA: NEGATIVE
HCT VFR BLD AUTO: 35.9 % (ref 37–48.5)
HCV AB SERPL QL IA: NEGATIVE
HGB BLD-MCNC: 11.4 G/DL (ref 12–16)
HYPOCHROMIA BLD QL SMEAR: ABNORMAL
IMM GRANULOCYTES # BLD AUTO: 0.34 K/UL (ref 0–0.04)
IMM GRANULOCYTES NFR BLD AUTO: 3 % (ref 0–0.5)
LDH SERPL L TO P-CCNC: 447 U/L (ref 110–260)
LYMPHOCYTES # BLD AUTO: 7.8 K/UL (ref 1–4.8)
LYMPHOCYTES NFR BLD: 69.2 % (ref 18–48)
MAGNESIUM SERPL-MCNC: 1.9 MG/DL (ref 1.6–2.6)
MCH RBC QN AUTO: 27.7 PG (ref 27–31)
MCHC RBC AUTO-ENTMCNC: 31.8 G/DL (ref 32–36)
MCV RBC AUTO: 87 FL (ref 82–98)
MONOCYTES # BLD AUTO: 0.6 K/UL (ref 0.3–1)
MONOCYTES NFR BLD: 5 % (ref 4–15)
NEUTROPHILS # BLD AUTO: 2.4 K/UL (ref 1.8–7.7)
NEUTROPHILS NFR BLD: 20.8 % (ref 38–73)
NRBC BLD-RTO: 0 /100 WBC
OVALOCYTES BLD QL SMEAR: ABNORMAL
PHOSPHATE SERPL-MCNC: 3.4 MG/DL (ref 2.7–4.5)
PLATELET # BLD AUTO: 211 K/UL (ref 150–350)
PMV BLD AUTO: 10.8 FL (ref 9.2–12.9)
POIKILOCYTOSIS BLD QL SMEAR: SLIGHT
POLYCHROMASIA BLD QL SMEAR: ABNORMAL
POTASSIUM SERPL-SCNC: 3.6 MMOL/L (ref 3.5–5.1)
PROT SERPL-MCNC: 5.5 G/DL (ref 6–8.4)
RBC # BLD AUTO: 4.11 M/UL (ref 4–5.4)
SODIUM SERPL-SCNC: 139 MMOL/L (ref 136–145)
TSH SERPL DL<=0.005 MIU/L-ACNC: 1.3 UIU/ML (ref 0.4–4)
URATE SERPL-MCNC: 3.8 MG/DL (ref 2.4–5.7)
WBC # BLD AUTO: 11.31 K/UL (ref 3.9–12.7)

## 2020-09-10 PROCEDURE — 99226 PR SUBSEQUENT OBSERVATION CARE,LEVEL III: CPT | Mod: ,,, | Performed by: PHYSICIAN ASSISTANT

## 2020-09-10 PROCEDURE — G0378 HOSPITAL OBSERVATION PER HR: HCPCS

## 2020-09-10 PROCEDURE — 25000003 PHARM REV CODE 250: Performed by: PHYSICIAN ASSISTANT

## 2020-09-10 PROCEDURE — 84550 ASSAY OF BLOOD/URIC ACID: CPT

## 2020-09-10 PROCEDURE — 94761 N-INVAS EAR/PLS OXIMETRY MLT: CPT

## 2020-09-10 PROCEDURE — 84100 ASSAY OF PHOSPHORUS: CPT

## 2020-09-10 PROCEDURE — 86665 EPSTEIN-BARR CAPSID VCA: CPT

## 2020-09-10 PROCEDURE — 87799 DETECT AGENT NOS DNA QUANT: CPT

## 2020-09-10 PROCEDURE — 88185 FLOWCYTOMETRY/TC ADD-ON: CPT | Performed by: PATHOLOGY

## 2020-09-10 PROCEDURE — 88189 FLOWCYTOMETRY/READ 16 & >: CPT | Mod: ,,, | Performed by: PATHOLOGY

## 2020-09-10 PROCEDURE — 83735 ASSAY OF MAGNESIUM: CPT

## 2020-09-10 PROCEDURE — 85025 COMPLETE CBC W/AUTO DIFF WBC: CPT

## 2020-09-10 PROCEDURE — 86663 EPSTEIN-BARR ANTIBODY: CPT

## 2020-09-10 PROCEDURE — 84443 ASSAY THYROID STIM HORMONE: CPT

## 2020-09-10 PROCEDURE — 83010 ASSAY OF HAPTOGLOBIN QUANT: CPT

## 2020-09-10 PROCEDURE — 85379 FIBRIN DEGRADATION QUANT: CPT

## 2020-09-10 PROCEDURE — 36415 COLL VENOUS BLD VENIPUNCTURE: CPT

## 2020-09-10 PROCEDURE — 88189 PR  FLOWCYTOMETRY/READ, 16 & > MARKERS: ICD-10-PCS | Mod: ,,, | Performed by: PATHOLOGY

## 2020-09-10 PROCEDURE — 99226 PR SUBSEQUENT OBSERVATION CARE,LEVEL III: ICD-10-PCS | Mod: ,,, | Performed by: PHYSICIAN ASSISTANT

## 2020-09-10 PROCEDURE — 86665 EPSTEIN-BARR CAPSID VCA: CPT | Mod: 59

## 2020-09-10 PROCEDURE — 88184 FLOWCYTOMETRY/ TC 1 MARKER: CPT | Performed by: PATHOLOGY

## 2020-09-10 PROCEDURE — 85384 FIBRINOGEN ACTIVITY: CPT

## 2020-09-10 PROCEDURE — 83615 LACTATE (LD) (LDH) ENZYME: CPT

## 2020-09-10 PROCEDURE — 25000003 PHARM REV CODE 250: Performed by: NURSE PRACTITIONER

## 2020-09-10 PROCEDURE — 80053 COMPREHEN METABOLIC PANEL: CPT

## 2020-09-10 RX ORDER — CALCIUM CARBONATE 200(500)MG
500 TABLET,CHEWABLE ORAL 2 TIMES DAILY PRN
Status: DISCONTINUED | OUTPATIENT
Start: 2020-09-10 | End: 2020-09-12 | Stop reason: HOSPADM

## 2020-09-10 RX ADMIN — ACETAMINOPHEN 650 MG: 325 TABLET ORAL at 09:09

## 2020-09-10 RX ADMIN — CALCIUM CARBONATE (ANTACID) CHEW TAB 500 MG 500 MG: 500 CHEW TAB at 09:09

## 2020-09-10 RX ADMIN — IOHEXOL 75 ML: 350 INJECTION, SOLUTION INTRAVENOUS at 11:09

## 2020-09-10 RX ADMIN — PROMETHAZINE HYDROCHLORIDE 25 MG: 25 TABLET ORAL at 04:09

## 2020-09-10 RX ADMIN — ACETAMINOPHEN 650 MG: 325 TABLET ORAL at 10:09

## 2020-09-10 NOTE — HOSPITAL COURSE
Ms. Sweeney was admitted to observation for transaminitis and fevers. RUQ ultrasound with hepatosplenomegaly, cholelithiasis without cholecystitis. Afebrile with leukocytosis on admit. Absolute Lymphocytosis noted. Peripheral smear revealed Mild leukocytosis with relative and absolute lymphocytosis, rare circulating immature granulocytes, and a subset of variably sized but predominantly large atypical cells with irregular nuclear contours, basophilic cytoplasm, and rare prominent nucleoli concerning for circulating lymphoma versus leukemia. Concerning for EBV infection, pending. Acute hep negative.  Heme/onc consulted for lymphocytosis and hepatosplenomegaly. Ferritin elevated to 931, , uric acid normal. D-dimer elevated. ALONZO negative. Flow cytometry pending. TB quant gold pending given recurrent fevers/ hepatomegaly. Patient intermittently spiking fevers during stay with leukocytosis. CT neck/chest/abdomen/pelvis with likely residual thymic tissue, otherwise no lymphadenopathy noted. Lactic acid WNL, procal negative, blood cultures ordered. Patient underwent bone marrow biopsy 9/11, results pending. ID consulted given recurrent fevers, appreciate assistance. Given acuity of symptoms, viral infection possible, agree with r/o lymphoma leukemia. EBV IgM positive, IgG negative, EBV DNA . Patient informed of positive results, treatment supportive care. Patient discharged. Referrals to heme/onc and ID given. Patient to have phone visit next week for biopsy results as she lives in [place]. Patient verbalized understanding. All questions answered.

## 2020-09-10 NOTE — MEDICAL/APP STUDENT
HEMATOLOGY & ONCOLOGY CONSULT NOTE       Patient ID: Dorothy Sweeney is a 30 y.o. female.    Chief Complaint: Abdominal Pain (with nausea and vomiting x 5 days. Fever x 4 days, broke yesterday. Sent from urgent care)    Ms. Sweeney is a 30 YOF with history of nephrolithiasis and PCOS presented to the urgent care yesterday for 6d history of abdominal pain with associated nausea, vomiting and fever (4d, highest 103.2F). She was noted to have elevated LFT's and lymphocytosis so she and was [place] [facility] [place] work up.    She is no longer N/V/ febrile.  She denies any hematemesis. She did not have diarrhea or constipation and denies dark or bloody stool.  She had a hard time keeping fluids/ food down, indeterminate weight loss.  Of note, she has only traveled to [place] 9/2/20 of recent, [place] was in 2016 per notes.  She has not socialized outside of the house and has only ordered take out. She denies any recent changes in diet or medications. No one else in the home is sick.  She has been sleeping in her brother's room d/t Mold remediation in her bedroom. She reports fatigue and some mild shortness of breath on exertion and intermittent headache.  She denies any LH, dizziness, CP, blurred vision, heat/ cold sensitivities, dysuria, frequency, urgency, flank pain. Denies vaginal discharge, irritation.  She reports mild cough, congestion over past few days as well. Had negative covid test and negative flu.  The hematology/oncology team has been consulted for evaluation of lymphocytosis and blood smear findings of     Mild leukocytosis with relative and absolute lymphocytosis, rare circulating immature granulocytes, and a subset of variably sized but predominantly large atypical cells with irregular nuclear contours, basophilic cytoplasm, and rare prominent nucleoli concerning for circulating lymphoma versus leukemia. Peripheral blood flow is strongly recommended. No significant diagnostic abnormalities  of erythrocytes or platelets (except occasional giant platelets).    She denies any weight loss. Pt has had some tenderness in RUQ as well. Pt had one day of night sweats earlier this week. Denies any recent adenopathy.      Review of Systems   Constitutional: Positive for appetite change and fatigue. Negative for fever.   HENT: Negative for sore throat.    Respiratory: Positive for cough and shortness of breath.    Cardiovascular: Negative for chest pain and palpitations.   Gastrointestinal: Positive for abdominal pain. Negative for abdominal distention, blood in stool, constipation, diarrhea, nausea and vomiting.   Neurological: Positive for headaches. Negative for dizziness and light-headedness.         Objective:      Physical Exam  Constitutional:       General: She is not in acute distress.     Appearance: Normal appearance. She is not ill-appearing, toxic-appearing or diaphoretic.   HENT:      Head: Normocephalic and atraumatic.   Eyes:      General: No scleral icterus.        Right eye: No discharge.         Left eye: No discharge.      Extraocular Movements: Extraocular movements intact.      Conjunctiva/sclera: Conjunctivae normal.      Pupils: Pupils are equal, round, and reactive to light.   Neck:      Musculoskeletal: Normal range of motion and neck supple.   Cardiovascular:      Rate and Rhythm: Normal rate and regular rhythm.      Pulses: Normal pulses.      Heart sounds: Normal heart sounds. No murmur. No friction rub. No gallop.    Pulmonary:      Effort: Pulmonary effort is normal. No respiratory distress.      Breath sounds: Normal breath sounds. No stridor. No wheezing, rhonchi or rales.   Chest:      Chest wall: No tenderness.   Abdominal:      General: Abdomen is flat. Bowel sounds are normal. There is no distension.      Palpations: Abdomen is soft. There is hepatomegaly and splenomegaly. There is no mass.      Tenderness: There is abdominal tenderness in the right upper quadrant and left  upper quadrant. There is no right CVA tenderness, left CVA tenderness, guarding or rebound. Negative signs include Watters's sign.      Hernia: No hernia is present.   Musculoskeletal: Normal range of motion.   Skin:     General: Skin is warm and dry.   Neurological:      General: No focal deficit present.      Mental Status: She is alert and oriented to person, place, and time. Mental status is at baseline.   Psychiatric:         Mood and Affect: Mood normal.         Assessment:       1. Transaminitis    2. Febrile illness    3. Non-intractable vomiting with nausea, unspecified vomiting type    4. Lymphocytosis    5. Chest pain        Plan:         Atypical Lymphocytosis  - WBC noted to be mildly elevated, absolute lymphocytosis of 7.8 K today   -  Peripheral smear revealed Mild leukocytosis with relative and absolute lymphocytosis, rare circulating immature granulocytes, and a subset of variably sized but predominantly large atypical cells with irregular nuclear contours, basophilic cytoplasm, and rare prominent nucleoli concerning for circulating lymphoma versus leukemia  - check an ALONZO, ferritin in am   - , uric acid normal   - Flow Cytometry pending  - Viral hepatitis panel and EBV studies ordered by primary and will be following  - recommend CT neck/chest/ A&P in evaluation of lymphoma  - consider BM bx , discussed with patient about doing this as inpatient if EBV negative or flow cytometry abnormal      Will continue to follow. Thank you for your consult.   Discussed with attending, Dr. Penaloza.    Stacy Sanders  MS BRODY Smith MD  Hematology/Oncology Fellow, PGY VI  [facility]

## 2020-09-10 NOTE — PLAN OF CARE
Pt resting in bed, VSS on room air. Pt ate 75% of dinner and did not experience nausea or vomiting. Pt developed a mild headache that was relieved with tylenol. Pt slept well during the night. Will continue to monitor.

## 2020-09-10 NOTE — ASSESSMENT & PLAN NOTE
Patient presents with febrile illness to 103 prior to admit, N/V, RUQ pain. Concerning for EBV infection vs acute hepatitis.  - afebrile on admit with leukocytosis of 13, now resolved  - see lymphocytosis  - LFTS elevated on admit, now down trending  - Hep panel negative  - EBV pending  - RUQ US: cholelithiasis without choleycystitis, hepatosplenomegaly  - hydrated with 2L NS in ED, no further fluids warranted  - RUQ tenderness on exam  - improving symptoms  - continue to monitor

## 2020-09-10 NOTE — ASSESSMENT & PLAN NOTE
Lymphocytosis with hepatosplenomegaly, concerning for infection for leukemia/ lymphoma  - heme/onc consulted, appreciate assistance  - Flow cytometry ordered  - EBV pending  - CT neck/chest/abdomen/pelvis with contrast ordered   - possible bone marrow biopsy tomorrow pending results

## 2020-09-10 NOTE — PROGRESS NOTES
[facility] Hospital Medicine  Progress Note    Patient Name: Dorothy Sweeney  MRN: [id number]  Patient Class: OP- Observation   Admission Date: 9/9/2020  Length of Stay: 0 days  Attending Physician: Anthony Wells MD  Primary Care Provider: Primary Doctor No    Hospital Medicine Team: [facility] HOSP MED F Kassie Collier PA-C    Subjective:     Principal Problem:Transaminitis        HPI:  29 y/o F sent from urgent care with c/o nausea and vomiting past 5 days ago, RUQ tenderness, abdominal discomfort, febrile x 3 days with peak temp of 103.2.  She was noted to have elevated LFT's and lymphocytosis so she was transferred to [facility] ED for acute Hepatitis work up.  She is no longer N/V/ febrile.  She did not have diarrhea or constipation.  She had a hard time keeping fluids/ food down, indeterminate weight loss.  Of note, she has only traveled to [place] 9/2/20 of recent, Deanna was in 2016 per notes.  She has not socialized outside of the house and has only ordered take out.  No one else in the home is sick.  She has been sleeping in her brother's room d/t Mold remediation in her bedroom, she denies any upper respiratory symptoms.  Her last BM was yesterday and nml color/ consistency. She reports fatigue and some mild shortness of breath on exertion.  She denies any LH, dizziness, CP, blurred vision, HA, heat/ cold sensitivities, dysuria, frequency, urgency, flank pain. Denies vaginal discharge, irritation.  She reports mild cough, congestion over past few days as well. Had negative covid test.    PMH: Nephrolithiasis, PCOS  Meds: OCP only  Sexually active: uses condoms. LMP 1 week ago lighter than usual. Bled for 1 day. Last nml menstrual cycle was 1 month ago.  LABS: Granulocytes 30, Lymph 63, Mono 1.0,  , , Lipase 25, Alb 3.3, TB 0.8, TP 7.1, HCG negative  Hepatic US: pending read     Admitted to hospital medicine for acute hepatitis of unknown etiology     Overview/Hospital  Course:  Ms. Sweeney was admitted to observation for transaminitis. Afebrile with leukocytosis on admit. Lymphocytosis noted with tear cells on smear. RUQ ultrasound with hepatosplenomegaly. EBV pending. Acute hep negative. Heme/onc consulted for lymphocytosis and hepatosplenomegaly.     Interval History: Patient reports continued fatigue, but improvement in N/V. Complaining of intermittent RUQ sharp, stabbing abdominal pain. Heme/onc consulted for concern for lymphoma vs. Leukemia. EBV/ blood flow pending. Possible bone marrow biopsy tomorrow pending results.     Review of Systems   Constitutional: Positive for fatigue. Negative for diaphoresis and fever.   HENT: Negative for congestion and rhinorrhea.    Respiratory: Negative for cough and shortness of breath.    Cardiovascular: Negative for chest pain and leg swelling.   Gastrointestinal: Positive for abdominal pain. Negative for abdominal distention, diarrhea and vomiting.   Genitourinary: Negative for difficulty urinating, frequency, hematuria and urgency.   Musculoskeletal: Negative for arthralgias and back pain.   Neurological: Positive for weakness. Negative for syncope and numbness.   Psychiatric/Behavioral: Negative for agitation, behavioral problems, confusion and decreased concentration.     Objective:     Vital Signs (Most Recent):  Temp: 98 °F (36.7 °C) (09/10/20 1532)  Pulse: 89 (09/10/20 1532)  Resp: 16 (09/10/20 1532)  BP: 106/63 (09/10/20 1532)  SpO2: 98 % (09/10/20 1532) Vital Signs (24h Range):  Temp:  [97.7 °F (36.5 °C)-98.9 °F (37.2 °C)] 98 °F (36.7 °C)  Pulse:  [] 89  Resp:  [16-20] 16  SpO2:  [97 %-99 %] 98 %  BP: ()/(50-69) 106/63     Weight: 76.3 kg (168 lb 3.4 oz)  Body mass index is 30.77 kg/m².    Intake/Output Summary (Last 24 hours) at 9/10/2020 0013  Last data filed at 9/10/2020 0530  Gross per 24 hour   Intake 420 ml   Output 975 ml   Net -555 ml      Physical Exam  Vitals signs and nursing note reviewed.   Constitutional:        Appearance: Normal appearance.   HENT:      Head: Normocephalic and atraumatic.      Mouth/Throat:      Mouth: Mucous membranes are moist.      Pharynx: Oropharynx is clear.   Eyes:      Conjunctiva/sclera: Conjunctivae normal.      Pupils: Pupils are equal, round, and reactive to light.   Cardiovascular:      Rate and Rhythm: Normal rate and regular rhythm.      Pulses: Normal pulses.      Heart sounds: Normal heart sounds.   Pulmonary:      Effort: Pulmonary effort is normal.      Breath sounds: Normal breath sounds.   Abdominal:      General: Abdomen is flat. Bowel sounds are normal.      Palpations: Abdomen is soft.      Tenderness: There is abdominal tenderness in the right upper quadrant.   Musculoskeletal: Normal range of motion.         General: No swelling or tenderness.   Skin:     General: Skin is warm and dry.   Neurological:      General: No focal deficit present.      Mental Status: She is alert and oriented to person, place, and time.   Psychiatric:         Mood and Affect: Mood normal.         Behavior: Behavior normal.         Significant Labs:   Bilirubin:   Recent Labs   Lab 09/09/20  1229 09/10/20  0337   BILITOT 0.8 0.6     CBC:   Recent Labs   Lab 09/09/20  1229 09/10/20  0337   WBC 13.16* 11.31   HGB 14.2 11.4*   HCT 44.7 35.9*    211     CMP:   Recent Labs   Lab 09/09/20  1229 09/10/20  0337   * 139   K 3.7 3.6    108   CO2 23 24   GLU 87 82   BUN 9 7   CREATININE 0.8 0.7   CALCIUM 8.9 8.0*   PROT 7.1 5.5*   ALBUMIN 3.3* 2.6*   BILITOT 0.8 0.6   ALKPHOS 341* 293*   * 196*   * 446*   ANIONGAP 9 7*   EGFRNONAA >60.0 >60.0     Magnesium:   Recent Labs   Lab 09/10/20  0337   MG 1.9       Significant Imaging: I have reviewed all pertinent imaging results/findings within the past 24 hours.      Assessment/Plan:      * Transaminitis  Patient presents with febrile illness to 103 prior to admit, N/V, RUQ pain. Concerning for EBV infection vs acute hepatitis.  -  afebrile on admit with leukocytosis of 13, now resolved  - see lymphocytosis  - LFTS elevated on admit, now down trending  - Hep panel negative  - EBV pending  - RUQ US: cholelithiasis without choleycystitis, hepatosplenomegaly  - hydrated with 2L NS in ED, no further fluids warranted  - RUQ tenderness on exam  - improving symptoms  - continue to monitor    Lymphocytosis  Lymphocytosis with hepatosplenomegaly, concerning for infection for leukemia/ lymphoma  - heme/onc consulted, appreciate assistance  - Flow cytometry ordered  - EBV pending  - CT neck/chest/abdomen/pelvis with contrast ordered   - possible bone marrow biopsy tomorrow pending results      VTE Risk Mitigation (From admission, onward)         Ordered     IP VTE HIGH RISK PATIENT  Once      09/09/20 1720     Place ANDREW hose  Until discontinued      09/09/20 1720                Discharge Planning   MONIKA: 9/11/2020     Code Status: Full Code   Is the patient medically ready for discharge?:     Reason for patient still in hospital (select all that apply): Patient new problem, Patient trending condition, Laboratory test, Treatment and Consult recommendations  Discharge Plan A: Home with family                  Kassie Collier PA-C  Department of Hospital Medicine   [facility]

## 2020-09-10 NOTE — PLAN OF CARE
CM met with patient at the bedside to discuss discharge planning assessment. Pt lives alone, but is currently in town from [place] visiting family pt has transportation at discharge. Pt verified PCP and Pharmacy. CM will continue to follow for discharge needs.      Dr. Jose Castorena, DO  [facility]     [facility]  [address] [place] [address]       09/10/20 1038   Discharge Assessment   Assessment Type Discharge Planning Assessment   Confirmed/corrected address and phone number on facesheet? Yes   Assessment information obtained from? Patient   Expected Length of Stay (days) 2   Communicated expected length of stay with patient/caregiver yes   Prior to hospitilization cognitive status: Alert/Oriented   Prior to hospitalization functional status: Independent   Current cognitive status: Alert/Oriented   Current Functional Status: Independent   Lives With alone   Able to Return to Prior Arrangements yes   Is patient able to care for self after discharge? Yes   Patient's perception of discharge disposition home or selfcare   Readmission Within the Last 30 Days no previous admission in last 30 days   Patient currently being followed by outpatient case management? No   Patient currently receives any other outside agency services? No   Equipment Currently Used at Home none   Do you have any problems affording any of your prescribed medications? No   Is the patient taking medications as prescribed? yes   Does the patient have transportation home? Yes   Transportation Anticipated family or friend will provide   Does the patient receive services at the [facility]? No   Discharge Plan A Home with family   Discharge Plan B Home   DME Needed Upon Discharge  none   Patient/Family in Agreement with Plan yes

## 2020-09-10 NOTE — SUBJECTIVE & OBJECTIVE
Interval History: Patient reports continued fatigue, but improvement in N/V. Complaining of intermittent RUQ sharp, stabbing abdominal pain. Heme/onc consulted for concern for lymphoma vs. Leukemia. EBV/ blood flow pending. Possible bone marrow biopsy tomorrow pending results.     Review of Systems   Constitutional: Positive for fatigue. Negative for diaphoresis and fever.   HENT: Negative for congestion and rhinorrhea.    Respiratory: Negative for cough and shortness of breath.    Cardiovascular: Negative for chest pain and leg swelling.   Gastrointestinal: Positive for abdominal pain. Negative for abdominal distention, diarrhea and vomiting.   Genitourinary: Negative for difficulty urinating, frequency, hematuria and urgency.   Musculoskeletal: Negative for arthralgias and back pain.   Neurological: Positive for weakness. Negative for syncope and numbness.   Psychiatric/Behavioral: Negative for agitation, behavioral problems, confusion and decreased concentration.     Objective:     Vital Signs (Most Recent):  Temp: 98 °F (36.7 °C) (09/10/20 1532)  Pulse: 89 (09/10/20 1532)  Resp: 16 (09/10/20 1532)  BP: 106/63 (09/10/20 1532)  SpO2: 98 % (09/10/20 1532) Vital Signs (24h Range):  Temp:  [97.7 °F (36.5 °C)-98.9 °F (37.2 °C)] 98 °F (36.7 °C)  Pulse:  [] 89  Resp:  [16-20] 16  SpO2:  [97 %-99 %] 98 %  BP: ()/(50-69) 106/63     Weight: 76.3 kg (168 lb 3.4 oz)  Body mass index is 30.77 kg/m².    Intake/Output Summary (Last 24 hours) at 9/10/2020 2466  Last data filed at 9/10/2020 0530  Gross per 24 hour   Intake 420 ml   Output 975 ml   Net -555 ml      Physical Exam  Vitals signs and nursing note reviewed.   Constitutional:       Appearance: Normal appearance.   HENT:      Head: Normocephalic and atraumatic.      Mouth/Throat:      Mouth: Mucous membranes are moist.      Pharynx: Oropharynx is clear.   Eyes:      Conjunctiva/sclera: Conjunctivae normal.      Pupils: Pupils are equal, round, and reactive to  light.   Cardiovascular:      Rate and Rhythm: Normal rate and regular rhythm.      Pulses: Normal pulses.      Heart sounds: Normal heart sounds.   Pulmonary:      Effort: Pulmonary effort is normal.      Breath sounds: Normal breath sounds.   Abdominal:      General: Abdomen is flat. Bowel sounds are normal.      Palpations: Abdomen is soft.      Tenderness: There is abdominal tenderness in the right upper quadrant.   Musculoskeletal: Normal range of motion.         General: No swelling or tenderness.   Skin:     General: Skin is warm and dry.   Neurological:      General: No focal deficit present.      Mental Status: She is alert and oriented to person, place, and time.   Psychiatric:         Mood and Affect: Mood normal.         Behavior: Behavior normal.         Significant Labs:   Bilirubin:   Recent Labs   Lab 09/09/20  1229 09/10/20  0337   BILITOT 0.8 0.6     CBC:   Recent Labs   Lab 09/09/20 1229 09/10/20  0337   WBC 13.16* 11.31   HGB 14.2 11.4*   HCT 44.7 35.9*    211     CMP:   Recent Labs   Lab 09/09/20  1229 09/10/20  0337   * 139   K 3.7 3.6    108   CO2 23 24   GLU 87 82   BUN 9 7   CREATININE 0.8 0.7   CALCIUM 8.9 8.0*   PROT 7.1 5.5*   ALBUMIN 3.3* 2.6*   BILITOT 0.8 0.6   ALKPHOS 341* 293*   * 196*   * 446*   ANIONGAP 9 7*   EGFRNONAA >60.0 >60.0     Magnesium:   Recent Labs   Lab 09/10/20  0337   MG 1.9       Significant Imaging: I have reviewed all pertinent imaging results/findings within the past 24 hours.

## 2020-09-11 PROBLEM — R50.9 FEVER OF UNKNOWN ORIGIN: Status: ACTIVE | Noted: 2020-09-11

## 2020-09-11 LAB
ALBUMIN SERPL BCP-MCNC: 2.7 G/DL (ref 3.5–5.2)
ALP SERPL-CCNC: 345 U/L (ref 55–135)
ALT SERPL W/O P-5'-P-CCNC: 382 U/L (ref 10–44)
ANA SER QL IF: NORMAL
ANION GAP SERPL CALC-SCNC: 6 MMOL/L (ref 8–16)
ANISOCYTOSIS BLD QL SMEAR: SLIGHT
AST SERPL-CCNC: 142 U/L (ref 10–40)
BASOPHILS # BLD AUTO: ABNORMAL K/UL (ref 0–0.2)
BASOPHILS NFR BLD: 0.5 % (ref 0–1.9)
BILIRUB SERPL-MCNC: 0.5 MG/DL (ref 0.1–1)
BUN SERPL-MCNC: 3 MG/DL (ref 6–20)
CALCIUM SERPL-MCNC: 8.3 MG/DL (ref 8.7–10.5)
CHLORIDE SERPL-SCNC: 109 MMOL/L (ref 95–110)
CO2 SERPL-SCNC: 24 MMOL/L (ref 23–29)
CREAT SERPL-MCNC: 0.7 MG/DL (ref 0.5–1.4)
DIFFERENTIAL METHOD: ABNORMAL
EOSINOPHIL # BLD AUTO: ABNORMAL K/UL (ref 0–0.5)
EOSINOPHIL NFR BLD: 0 % (ref 0–8)
ERYTHROCYTE [DISTWIDTH] IN BLOOD BY AUTOMATED COUNT: 13.5 % (ref 11.5–14.5)
EST. GFR  (AFRICAN AMERICAN): >60 ML/MIN/1.73 M^2
EST. GFR  (NON AFRICAN AMERICAN): >60 ML/MIN/1.73 M^2
FERRITIN SERPL-MCNC: 931 NG/ML (ref 20–300)
FLOW CYTOMETRY ANTIBODIES ANALYZED - BLOOD: NORMAL
FLOW CYTOMETRY COMMENT - BLOOD: NORMAL
FLOW CYTOMETRY INTERPRETATION - BLOOD: NORMAL
GLUCOSE SERPL-MCNC: 83 MG/DL (ref 70–110)
HCT VFR BLD AUTO: 38.6 % (ref 37–48.5)
HGB BLD-MCNC: 12.1 G/DL (ref 12–16)
IMM GRANULOCYTES # BLD AUTO: ABNORMAL K/UL (ref 0–0.04)
IMM GRANULOCYTES NFR BLD AUTO: ABNORMAL % (ref 0–0.5)
LACTATE SERPL-SCNC: 1.2 MMOL/L (ref 0.5–2.2)
LYMPHOCYTES # BLD AUTO: ABNORMAL K/UL (ref 1–4.8)
LYMPHOCYTES NFR BLD: 65.5 % (ref 18–48)
MAGNESIUM SERPL-MCNC: 2 MG/DL (ref 1.6–2.6)
MCH RBC QN AUTO: 28 PG (ref 27–31)
MCHC RBC AUTO-ENTMCNC: 31.3 G/DL (ref 32–36)
MCV RBC AUTO: 89 FL (ref 82–98)
METAMYELOCYTES NFR BLD MANUAL: 1 %
MONOCYTES # BLD AUTO: ABNORMAL K/UL (ref 0.3–1)
MONOCYTES NFR BLD: 5.5 % (ref 4–15)
MYELOCYTES NFR BLD MANUAL: 1 %
NEUTROPHILS NFR BLD: 23.5 % (ref 38–73)
NEUTS BAND NFR BLD MANUAL: 3 %
NRBC BLD-RTO: 0 /100 WBC
PHOSPHATE SERPL-MCNC: 4.2 MG/DL (ref 2.7–4.5)
PLATELET # BLD AUTO: 215 K/UL (ref 150–350)
PLATELET BLD QL SMEAR: ABNORMAL
PMV BLD AUTO: 10.2 FL (ref 9.2–12.9)
POTASSIUM SERPL-SCNC: 3.7 MMOL/L (ref 3.5–5.1)
PROCALCITONIN SERPL IA-MCNC: 0.06 NG/ML
PROT SERPL-MCNC: 5.8 G/DL (ref 6–8.4)
RBC # BLD AUTO: 4.32 M/UL (ref 4–5.4)
SMUDGE CELLS BLD QL SMEAR: PRESENT
SODIUM SERPL-SCNC: 139 MMOL/L (ref 136–145)
WBC # BLD AUTO: 12.98 K/UL (ref 3.9–12.7)

## 2020-09-11 PROCEDURE — 87040 BLOOD CULTURE FOR BACTERIA: CPT | Mod: 59

## 2020-09-11 PROCEDURE — 25500020 PHARM REV CODE 255: Performed by: HOSPITALIST

## 2020-09-11 PROCEDURE — 88185 FLOWCYTOMETRY/TC ADD-ON: CPT | Mod: 59 | Performed by: PATHOLOGY

## 2020-09-11 PROCEDURE — 84145 PROCALCITONIN (PCT): CPT

## 2020-09-11 PROCEDURE — 38222 DX BONE MARROW BX & ASPIR: CPT | Mod: RT,,, | Performed by: STUDENT IN AN ORGANIZED HEALTH CARE EDUCATION/TRAINING PROGRAM

## 2020-09-11 PROCEDURE — 86704 HEP B CORE ANTIBODY TOTAL: CPT

## 2020-09-11 PROCEDURE — 86038 ANTINUCLEAR ANTIBODIES: CPT

## 2020-09-11 PROCEDURE — 99233 PR SUBSEQUENT HOSPITAL CARE,LEVL III: ICD-10-PCS | Mod: ,,, | Performed by: INTERNAL MEDICINE

## 2020-09-11 PROCEDURE — 88311 DECALCIFY TISSUE: CPT | Performed by: PATHOLOGY

## 2020-09-11 PROCEDURE — 88237 TISSUE CULTURE BONE MARROW: CPT

## 2020-09-11 PROCEDURE — 25000003 PHARM REV CODE 250: Performed by: PHYSICIAN ASSISTANT

## 2020-09-11 PROCEDURE — 99223 1ST HOSP IP/OBS HIGH 75: CPT | Mod: ,,, | Performed by: INTERNAL MEDICINE

## 2020-09-11 PROCEDURE — 88184 FLOWCYTOMETRY/ TC 1 MARKER: CPT | Performed by: PATHOLOGY

## 2020-09-11 PROCEDURE — 63600175 PHARM REV CODE 636 W HCPCS: Performed by: PHYSICIAN ASSISTANT

## 2020-09-11 PROCEDURE — 88311 DECALCIFY TISSUE: CPT | Mod: 26,,, | Performed by: PATHOLOGY

## 2020-09-11 PROCEDURE — 99226 PR SUBSEQUENT OBSERVATION CARE,LEVEL III: ICD-10-PCS | Mod: ,,, | Performed by: PHYSICIAN ASSISTANT

## 2020-09-11 PROCEDURE — 88342 IMHCHEM/IMCYTCHM 1ST ANTB: CPT | Mod: 26,59,, | Performed by: PATHOLOGY

## 2020-09-11 PROCEDURE — 86480 TB TEST CELL IMMUN MEASURE: CPT

## 2020-09-11 PROCEDURE — 99233 SBSQ HOSP IP/OBS HIGH 50: CPT | Mod: ,,, | Performed by: INTERNAL MEDICINE

## 2020-09-11 PROCEDURE — 88189 PR  FLOWCYTOMETRY/READ, 16 & > MARKERS: ICD-10-PCS | Mod: ,,, | Performed by: PATHOLOGY

## 2020-09-11 PROCEDURE — 38222 BONE MARROW: ICD-10-PCS | Mod: RT,,, | Performed by: STUDENT IN AN ORGANIZED HEALTH CARE EDUCATION/TRAINING PROGRAM

## 2020-09-11 PROCEDURE — 88365 INSITU HYBRIDIZATION (FISH): CPT | Performed by: PATHOLOGY

## 2020-09-11 PROCEDURE — 88271 CYTOGENETICS DNA PROBE: CPT | Mod: 59

## 2020-09-11 PROCEDURE — 25000003 PHARM REV CODE 250: Performed by: STUDENT IN AN ORGANIZED HEALTH CARE EDUCATION/TRAINING PROGRAM

## 2020-09-11 PROCEDURE — 85027 COMPLETE CBC AUTOMATED: CPT

## 2020-09-11 PROCEDURE — 88342 CHG IMMUNOCYTOCHEMISTRY: ICD-10-PCS | Mod: 26,59,, | Performed by: PATHOLOGY

## 2020-09-11 PROCEDURE — 88312 PR  SPECIAL STAINS,GROUP I: ICD-10-PCS | Mod: 26,,, | Performed by: PATHOLOGY

## 2020-09-11 PROCEDURE — 88341 IMHCHEM/IMCYTCHM EA ADD ANTB: CPT | Mod: 26,59,, | Performed by: PATHOLOGY

## 2020-09-11 PROCEDURE — 85007 BL SMEAR W/DIFF WBC COUNT: CPT | Mod: NCS

## 2020-09-11 PROCEDURE — 88365 PR  TISSUE HYBRIDIZATION: ICD-10-PCS | Mod: 26,,, | Performed by: PATHOLOGY

## 2020-09-11 PROCEDURE — 88312 SPECIAL STAINS GROUP 1: CPT | Mod: 26,,, | Performed by: PATHOLOGY

## 2020-09-11 PROCEDURE — 88305 TISSUE EXAM BY PATHOLOGIST: CPT | Performed by: PATHOLOGY

## 2020-09-11 PROCEDURE — 88189 FLOWCYTOMETRY/READ 16 & >: CPT | Mod: ,,, | Performed by: PATHOLOGY

## 2020-09-11 PROCEDURE — 83605 ASSAY OF LACTIC ACID: CPT

## 2020-09-11 PROCEDURE — 88285 CHROMOSOME COUNT ADDITIONAL: CPT

## 2020-09-11 PROCEDURE — 84100 ASSAY OF PHOSPHORUS: CPT

## 2020-09-11 PROCEDURE — 80053 COMPREHEN METABOLIC PANEL: CPT

## 2020-09-11 PROCEDURE — 88305 TISSUE EXAM BY PATHOLOGIST: ICD-10-PCS | Mod: 26,,, | Performed by: PATHOLOGY

## 2020-09-11 PROCEDURE — 88313 PR  SPECIAL STAINS,GROUP II: ICD-10-PCS | Mod: 26,,, | Performed by: PATHOLOGY

## 2020-09-11 PROCEDURE — 88313 SPECIAL STAINS GROUP 2: CPT | Mod: 59 | Performed by: PATHOLOGY

## 2020-09-11 PROCEDURE — 88312 SPECIAL STAINS GROUP 1: CPT | Mod: 59 | Performed by: PATHOLOGY

## 2020-09-11 PROCEDURE — 83735 ASSAY OF MAGNESIUM: CPT

## 2020-09-11 PROCEDURE — 36415 COLL VENOUS BLD VENIPUNCTURE: CPT

## 2020-09-11 PROCEDURE — 88342 IMHCHEM/IMCYTCHM 1ST ANTB: CPT | Performed by: PATHOLOGY

## 2020-09-11 PROCEDURE — 88264 CHROMOSOME ANALYSIS 20-25: CPT

## 2020-09-11 PROCEDURE — 99223 PR INITIAL HOSPITAL CARE,LEVL III: ICD-10-PCS | Mod: ,,, | Performed by: INTERNAL MEDICINE

## 2020-09-11 PROCEDURE — 96375 TX/PRO/DX INJ NEW DRUG ADDON: CPT | Performed by: EMERGENCY MEDICINE

## 2020-09-11 PROCEDURE — 63600175 PHARM REV CODE 636 W HCPCS: Performed by: STUDENT IN AN ORGANIZED HEALTH CARE EDUCATION/TRAINING PROGRAM

## 2020-09-11 PROCEDURE — 12000002 HC ACUTE/MED SURGE SEMI-PRIVATE ROOM

## 2020-09-11 PROCEDURE — 25000003 PHARM REV CODE 250: Performed by: NURSE PRACTITIONER

## 2020-09-11 PROCEDURE — 85097 PR  BONE MARROW,SMEAR INTERPRETATION: ICD-10-PCS | Mod: ,,, | Performed by: PATHOLOGY

## 2020-09-11 PROCEDURE — 88341 PR IHC OR ICC EACH ADD'L SINGLE ANTIBODY  STAINPR: ICD-10-PCS | Mod: 26,59,, | Performed by: PATHOLOGY

## 2020-09-11 PROCEDURE — 85097 BONE MARROW INTERPRETATION: CPT | Mod: ,,, | Performed by: PATHOLOGY

## 2020-09-11 PROCEDURE — 88341 IMHCHEM/IMCYTCHM EA ADD ANTB: CPT | Performed by: PATHOLOGY

## 2020-09-11 PROCEDURE — 88311 PR  DECALCIFY TISSUE: ICD-10-PCS | Mod: 26,,, | Performed by: PATHOLOGY

## 2020-09-11 PROCEDURE — 88313 SPECIAL STAINS GROUP 2: CPT | Mod: 26,,, | Performed by: PATHOLOGY

## 2020-09-11 PROCEDURE — 88275 CYTOGENETICS 100-300: CPT | Mod: 59

## 2020-09-11 PROCEDURE — 99226 PR SUBSEQUENT OBSERVATION CARE,LEVEL III: CPT | Mod: ,,, | Performed by: PHYSICIAN ASSISTANT

## 2020-09-11 PROCEDURE — 88365 INSITU HYBRIDIZATION (FISH): CPT | Mod: 26,,, | Performed by: PATHOLOGY

## 2020-09-11 PROCEDURE — 88299 UNLISTED CYTOGENETIC STUDY: CPT

## 2020-09-11 PROCEDURE — 94761 N-INVAS EAR/PLS OXIMETRY MLT: CPT

## 2020-09-11 PROCEDURE — 82728 ASSAY OF FERRITIN: CPT

## 2020-09-11 PROCEDURE — 88305 TISSUE EXAM BY PATHOLOGIST: CPT | Mod: 26,,, | Performed by: PATHOLOGY

## 2020-09-11 RX ORDER — HYDROMORPHONE HYDROCHLORIDE 1 MG/ML
1 INJECTION, SOLUTION INTRAMUSCULAR; INTRAVENOUS; SUBCUTANEOUS ONCE AS NEEDED
Status: COMPLETED | OUTPATIENT
Start: 2020-09-11 | End: 2020-09-11

## 2020-09-11 RX ORDER — SODIUM CHLORIDE 9 MG/ML
INJECTION, SOLUTION INTRAVENOUS CONTINUOUS
Status: ACTIVE | OUTPATIENT
Start: 2020-09-11 | End: 2020-09-11

## 2020-09-11 RX ORDER — LORAZEPAM 2 MG/ML
0.5 INJECTION INTRAMUSCULAR ONCE AS NEEDED
Status: COMPLETED | OUTPATIENT
Start: 2020-09-11 | End: 2020-09-11

## 2020-09-11 RX ORDER — LIDOCAINE HYDROCHLORIDE 20 MG/ML
10 INJECTION, SOLUTION INFILTRATION; PERINEURAL ONCE
Status: COMPLETED | OUTPATIENT
Start: 2020-09-11 | End: 2020-09-11

## 2020-09-11 RX ORDER — CETIRIZINE HYDROCHLORIDE 5 MG/1
5 TABLET ORAL DAILY
Status: DISCONTINUED | OUTPATIENT
Start: 2020-09-11 | End: 2020-09-12 | Stop reason: HOSPADM

## 2020-09-11 RX ADMIN — PROMETHAZINE HYDROCHLORIDE 25 MG: 25 INJECTION INTRAMUSCULAR; INTRAVENOUS at 06:09

## 2020-09-11 RX ADMIN — ACETAMINOPHEN 650 MG: 325 TABLET ORAL at 08:09

## 2020-09-11 RX ADMIN — LORAZEPAM 0.5 MG: 2 INJECTION INTRAMUSCULAR; INTRAVENOUS at 09:09

## 2020-09-11 RX ADMIN — HYDROMORPHONE HYDROCHLORIDE 0.5 MG: 1 INJECTION, SOLUTION INTRAMUSCULAR; INTRAVENOUS; SUBCUTANEOUS at 09:09

## 2020-09-11 RX ADMIN — LIDOCAINE HYDROCHLORIDE 10 ML: 20 INJECTION, SOLUTION INFILTRATION; PERINEURAL at 08:09

## 2020-09-11 RX ADMIN — CALCIUM CARBONATE (ANTACID) CHEW TAB 500 MG 500 MG: 500 CHEW TAB at 08:09

## 2020-09-11 RX ADMIN — SODIUM CHLORIDE, SODIUM LACTATE, POTASSIUM CHLORIDE, AND CALCIUM CHLORIDE 1000 ML: .6; .31; .03; .02 INJECTION, SOLUTION INTRAVENOUS at 12:09

## 2020-09-11 RX ADMIN — SODIUM CHLORIDE: 0.9 INJECTION, SOLUTION INTRAVENOUS at 10:09

## 2020-09-11 NOTE — ASSESSMENT & PLAN NOTE
Transaminitis  Fever of unknown origin  Patient presents with recurrent fevers, RUQ abdominal tenderness, N/V. Absolute lymphocytosis noted on admit, concerning for lymphoma/ leukemia/ EBV infection.   - Recurrent fevers throughout hospital stay  - WBC 13>   - LFTs elevated on admit, now down trending  - RUQ with cholelithiasis without cholecystitis, heptasplenomegaly  - Absolute lymphocytosis on admit  - Peripheral smear revealed Mild leukocytosis with relative and absolute lymphocytosis, rare circulating immature granulocytes, and a subset of variably sized but predominantly large atypical cells with irregular nuclear contours, basophilic cytoplasm, and rare prominent nucleoli concerning for circulating lymphoma versus leukemia  - heme/onc consulted, appreciate assistance  - Flow cytometry ordered  - EBV pending; TB quant gold pending  - acute hep panel negative  - CT neck/chest/abdomen/pelvis with contrast with residual thymic tissue, otherwise no lymphadenopathy noted  - D-dimer elevated, suspect acute coagulapathy rather than clot  - ferritin elevated to 931  - , uric acid normal   - underwent bone marrow biopsy 9/11  - 9/11: Patient continues to spike fevers, now hypotensive. Giving 2L LR  - blood cultures, procal, lactic pending  - ID consulted per heme/onc recs, appreciate assistance

## 2020-09-11 NOTE — ASSESSMENT & PLAN NOTE
- afebrile on admit with leukocytosis of 13, now resolved  - see lymphocytosis  - LFTS elevated on admit, now down trending  - Hep panel negative  - EBV pending  - RUQ US: cholelithiasis without choleycystitis, hepatosplenomegaly  - hydrated with 2L NS in ED  - RUQ tenderness on exam  - improving symptoms  - continue to monitor

## 2020-09-11 NOTE — ASSESSMENT & PLAN NOTE
29 y/o F w/o significant PMhx who was referred to ED from urgent care with nausea, vomiting, RUQ abd pain, febrile x 3 days prior to presentation. Symptoms began ~ 1.5 weeks ago including fatigue. She was noted to have elevated LFT's and mild leukocytosis w/ lymphocytic predominance so she was transferred to [facility]. Hospital course c/b intermittent febrile episodes and hypotension. Abd US 9/9 w/ few gallstones measuring up to 2.5 cm but no evidence of acute cholecystitis. CT C/A/P w/ 3.2 x 1.8 cm soft tissue mass within the anterior mediastinum likely representing residual thymic tissue. Hematology/oncology performed bone marrow biopsy 9/11. ID consulted for fever and lymphocytosis.    Recommendations  Agree with holding antibiotics and monitor clinically  Patient and mother requesting labs be drawn to minimize blood draws  F/u HIV Ag/Ab, HIV quant (as Ag/Ab would be negative in acute infection), hep B core total, CMV and EBV quant (ordered for AM)

## 2020-09-11 NOTE — CONSULTS
Please see consult note from earlier in the day from our service. This note was written to answer the consult.     Dilan Smith MD  HEmatology/Oncology Fellow, PGY VI  [facility]

## 2020-09-11 NOTE — SUBJECTIVE & OBJECTIVE
Past Medical History:   Diagnosis Date    PCOS (polycystic ovarian syndrome)        Past Surgical History:   Procedure Laterality Date    TONSILLECTOMY      WRIST SURGERY         Review of patient's allergies indicates:  No Known Allergies    Medications:  Facility-Administered Medications Prior to Admission   Medication    [COMPLETED] promethazine injection 25 mg    [DISCONTINUED] ondansetron disintegrating tablet 4 mg     Medications Prior to Admission   Medication Sig    levonorgestrel-ethinyl estradiol (LILLOW, 28,) 0.15-0.03 mg per tablet Take 1 tablet by mouth once daily.    drospirenone-ethinyl estradiol (OCELLA) 3-0.03 mg per tablet Take 1 tablet by mouth once daily.     Antibiotics (From admission, onward)    None        Antifungals (From admission, onward)    None        Antivirals (From admission, onward)    None           Immunization History   Administered Date(s) Administered    Hepatitis A, Adult 01/15/2016    Influenza - Quadrivalent - PF *Preferred* (6 months and older) 01/15/2016       Family History     None        Social History     Socioeconomic History    Marital status: Single     Spouse name: Not on file    Number of children: Not on file    Years of education: Not on file    Highest education level: Not on file   Occupational History    Not on file   Social Needs    Financial resource strain: Not on file    Food insecurity     Worry: Not on file     Inability: Not on file    Transportation needs     Medical: Not on file     Non-medical: Not on file   Tobacco Use    Smoking status: Never Smoker   Substance and Sexual Activity    Alcohol use: Yes    Drug use: No    Sexual activity: Not on file   Lifestyle    Physical activity     Days per week: Not on file     Minutes per session: Not on file    Stress: Not on file   Relationships    Social connections     Talks on phone: Not on file     Gets together: Not on file     Attends Oriental orthodox service: Not on file     Active  member of club or organization: Not on file     Attends meetings of clubs or organizations: Not on file     Relationship status: Not on file   Other Topics Concern    Not on file   Social History Narrative    Not on file     Review of Systems   Constitutional: Positive for fatigue and fever.   HENT: Negative for congestion.    Eyes: Negative for photophobia.   Respiratory: Negative for cough and shortness of breath.    Cardiovascular: Negative for chest pain and leg swelling.   Gastrointestinal: Positive for abdominal pain and nausea. Negative for abdominal distention, diarrhea and vomiting.   Genitourinary: Negative for difficulty urinating and frequency.   Musculoskeletal: Negative for arthralgias, back pain and myalgias.   Skin: Negative for rash.   Neurological: Positive for weakness. Negative for headaches.   Psychiatric/Behavioral: Negative for agitation and confusion.     Objective:     Vital Signs (Most Recent):  Temp: 97.8 °F (36.6 °C) (09/11/20 1529)  Pulse: 110 (09/11/20 1529)  Resp: 16 (09/11/20 1529)  BP: 115/68 (09/11/20 1529)  SpO2: 98 % (09/11/20 1529) Vital Signs (24h Range):  Temp:  [97.8 °F (36.6 °C)-101.6 °F (38.7 °C)] 97.8 °F (36.6 °C)  Pulse:  [] 110  Resp:  [16-18] 16  SpO2:  [96 %-98 %] 98 %  BP: ()/(46-71) 115/68     Weight: 76.3 kg (168 lb 3.4 oz)  Body mass index is 30.77 kg/m².    Estimated Creatinine Clearance: 113.4 mL/min (based on SCr of 0.7 mg/dL).    Physical Exam  Vitals signs reviewed.   Constitutional:       General: She is not in acute distress.     Appearance: She is not ill-appearing or toxic-appearing.   HENT:      Head: Normocephalic and atraumatic.      Mouth/Throat:      Mouth: Mucous membranes are dry.      Pharynx: Oropharynx is clear.   Eyes:      General: No scleral icterus.     Conjunctiva/sclera: Conjunctivae normal.   Neck:      Musculoskeletal: No neck rigidity.   Cardiovascular:      Rate and Rhythm: Normal rate and regular rhythm.      Pulses:  Normal pulses.      Heart sounds: Normal heart sounds.   Pulmonary:      Effort: Pulmonary effort is normal.      Breath sounds: Normal breath sounds.   Abdominal:      General: Abdomen is flat. Bowel sounds are normal.      Palpations: Abdomen is soft.      Tenderness: There is abdominal tenderness in the right upper quadrant.   Musculoskeletal: Normal range of motion.         General: No swelling or tenderness.   Skin:     General: Skin is warm and dry.   Neurological:      General: No focal deficit present.      Mental Status: She is alert and oriented to person, place, and time.   Psychiatric:         Mood and Affect: Mood normal.         Behavior: Behavior normal.         Significant Labs:   Blood Culture: No results for input(s): LABBLOO in the last 4320 hours.  CBC:   Recent Labs   Lab 09/10/20  0337 09/11/20  0255   WBC 11.31 12.98*   HGB 11.4* 12.1   HCT 35.9* 38.6    215     CMP:   Recent Labs   Lab 09/10/20  0337 09/11/20  0255    139   K 3.6 3.7    109   CO2 24 24   GLU 82 83   BUN 7 3*   CREATININE 0.7 0.7   CALCIUM 8.0* 8.3*   PROT 5.5* 5.8*   ALBUMIN 2.6* 2.7*   BILITOT 0.6 0.5   ALKPHOS 293* 345*   * 142*   * 382*   ANIONGAP 7* 6*   EGFRNONAA >60.0 >60.0     Microbiology Results (last 7 days)     Procedure Component Value Units Date/Time    Blood culture [[id number]] Collected: 09/11/20 1151    Order Status: Sent Specimen: Blood from Antecubital, Right Arm Updated: 09/11/20 1333    Blood culture [[id number]] Collected: 09/11/20 1151    Order Status: Sent Specimen: Blood Updated: 09/11/20 1333          Significant Imaging: I have reviewed all pertinent imaging results/findings within the past 24 hours.

## 2020-09-11 NOTE — PROGRESS NOTES
[facility] Hospital Medicine  Progress Note    Patient Name: Dorothy Sweeney  MRN: [id number]  Patient Class: OP- Observation   Admission Date: 9/9/2020  Length of Stay: 0 days  Attending Physician: Anthony Wells MD  Primary Care Provider: Primary Doctor No    Hospital Medicine Team: [facility] HOSP MED F Kassie Collier PA-C    Subjective:     Principal Problem:Transaminitis        HPI:  29 y/o F sent from urgent care with c/o nausea and vomiting past 5 days ago, RUQ tenderness, abdominal discomfort, febrile x 3 days with peak temp of 103.2.  She was noted to have elevated LFT's and lymphocytosis so she was transferred to [facility] ED for acute Hepatitis work up.  She is no longer N/V/ febrile.  She did not have diarrhea or constipation.  She had a hard time keeping fluids/ food down, indeterminate weight loss.  Of note, she has only traveled to [place] 9/2/20 of recent, Deanna was in 2016 per notes.  She has not socialized outside of the house and has only ordered take out.  No one else in the home is sick.  She has been sleeping in her brother's room d/t Mold remediation in her bedroom, she denies any upper respiratory symptoms.  Her last BM was yesterday and nml color/ consistency. She reports fatigue and some mild shortness of breath on exertion.  She denies any LH, dizziness, CP, blurred vision, HA, heat/ cold sensitivities, dysuria, frequency, urgency, flank pain. Denies vaginal discharge, irritation.  She reports mild cough, congestion over past few days as well. Had negative covid test.    PMH: Nephrolithiasis, PCOS  Meds: OCP only  Sexually active: uses condoms. LMP 1 week ago lighter than usual. Bled for 1 day. Last nml menstrual cycle was 1 month ago.  LABS: Granulocytes 30, Lymph 63, Mono 1.0,  , , Lipase 25, Alb 3.3, TB 0.8, TP 7.1, HCG negative  Hepatic US: pending read     Admitted to hospital medicine for acute hepatitis of unknown etiology     Overview/Hospital  Course:  Ms. Sweeney was admitted to observation for transaminitis. Afebrile with leukocytosis on admit. Absolute Lymphocytosis on admit. Peripheral smear revealed Mild leukocytosis with relative and absolute lymphocytosis, rare circulating immature granulocytes, and a subset of variably sized but predominantly large atypical cells with irregular nuclear contours, basophilic cytoplasm, and rare prominent nucleoli concerning for circulating lymphoma versus leukemia. RUQ ultrasound with hepatosplenomegaly, cholelithiasis without cholecystitis. Patient with tenderness in RUQ. EBV pending. Acute hep negative. Patient intermittently spiking fevers during stay. Heme/onc consulted for lymphocytosis and hepatosplenomegaly. Ferritin elevated to 931, , uric acid normal. Flow cytometry pending. TB quant gold pending given recurrent fevers/ hepatomegaly. CT neck/chest/abdomen/pelvis with likely residual thymic tissue, otherwise no lymphadenopathy noted. Patient underwent bone marrow biopsy 9/11.     Interval History: Patient reports RUQ abdominal tenderness, fatigue, fevers, nausea, and dehydration. Spiked temperature 101.2 overnight, with leukocytosis of 12.98. Hypotensive to 71/46, will give 2L LR. Procal, lactic, Blood cultures ordered. Underwent bone marrow biopsy today. Quant gold pending for TB. Per heme/onc recs, ID consulted for recurrent fevers. AST/ALT down trending. Alk Phos stable. No evidence of cholecystitis on imaging.     Review of Systems   Constitutional: Positive for fatigue and fever. Negative for diaphoresis.   HENT: Negative for congestion and rhinorrhea.    Respiratory: Negative for cough and shortness of breath.    Cardiovascular: Negative for chest pain and leg swelling.   Gastrointestinal: Positive for abdominal pain and nausea. Negative for abdominal distention, diarrhea and vomiting.   Genitourinary: Negative for difficulty urinating, frequency, hematuria and urgency.   Musculoskeletal: Negative  for arthralgias and back pain.   Neurological: Positive for weakness. Negative for syncope, numbness and headaches.   Psychiatric/Behavioral: Negative for agitation, behavioral problems, confusion and decreased concentration.     Objective:     Vital Signs (Most Recent):  Temp: 98.1 °F (36.7 °C) (09/11/20 1137)  Pulse: 95 (09/11/20 1137)  Resp: 16 (09/11/20 1137)  BP: (!) 71/46 (09/11/20 1137)  SpO2: 96 % (09/11/20 1137) Vital Signs (24h Range):  Temp:  [98 °F (36.7 °C)-101.6 °F (38.7 °C)] 98.1 °F (36.7 °C)  Pulse:  [] 95  Resp:  [16-18] 16  SpO2:  [96 %-98 %] 96 %  BP: ()/(46-71) 71/46     Weight: 76.3 kg (168 lb 3.4 oz)  Body mass index is 30.77 kg/m².  No intake or output data in the 24 hours ending 09/11/20 1151   Physical Exam  Vitals signs and nursing note reviewed.   Constitutional:       Appearance: Normal appearance.   HENT:      Head: Normocephalic and atraumatic.      Mouth/Throat:      Mouth: Mucous membranes are dry.      Pharynx: Oropharynx is clear.   Eyes:      Conjunctiva/sclera: Conjunctivae normal.      Pupils: Pupils are equal, round, and reactive to light.   Cardiovascular:      Rate and Rhythm: Normal rate and regular rhythm.      Pulses: Normal pulses.      Heart sounds: Normal heart sounds.   Pulmonary:      Effort: Pulmonary effort is normal.      Breath sounds: Normal breath sounds.   Abdominal:      General: Abdomen is flat. Bowel sounds are normal.      Palpations: Abdomen is soft.      Tenderness: There is abdominal tenderness in the right upper quadrant.   Musculoskeletal: Normal range of motion.         General: No swelling or tenderness.   Skin:     General: Skin is warm and dry.   Neurological:      General: No focal deficit present.      Mental Status: She is alert and oriented to person, place, and time.   Psychiatric:         Mood and Affect: Mood normal.         Behavior: Behavior normal.         Significant Labs:   Bilirubin:   Recent Labs   Lab 09/09/20  1229  09/10/20  0337 09/11/20  0255   BILITOT 0.8 0.6 0.5     CBC:   Recent Labs   Lab 09/09/20  1229 09/10/20  0337 09/11/20  0255   WBC 13.16* 11.31 12.98*   HGB 14.2 11.4* 12.1   HCT 44.7 35.9* 38.6    211 215     CMP:   Recent Labs   Lab 09/09/20  1229 09/10/20  0337 09/11/20  0255   * 139 139   K 3.7 3.6 3.7    108 109   CO2 23 24 24   GLU 87 82 83   BUN 9 7 3*   CREATININE 0.8 0.7 0.7   CALCIUM 8.9 8.0* 8.3*   PROT 7.1 5.5* 5.8*   ALBUMIN 3.3* 2.6* 2.7*   BILITOT 0.8 0.6 0.5   ALKPHOS 341* 293* 345*   * 196* 142*   * 446* 382*   ANIONGAP 9 7* 6*   EGFRNONAA >60.0 >60.0 >60.0     Magnesium:   Recent Labs   Lab 09/10/20  0337 09/11/20  0255   MG 1.9 2.0     POCT Glucose: No results for input(s): POCTGLUCOSE in the last 48 hours.    Significant Imaging: I have reviewed all pertinent imaging results/findings within the past 24 hours.      Assessment/Plan:      * Transaminitis  Patient presents with febrile illness to 103 prior to admit, N/V, RUQ pain. Concerning for EBV infection vs acute hepatitis.  - afebrile on admit with leukocytosis of 13, now resolved  - see lymphocytosis  - LFTS elevated on admit, now down trending  - Hep panel negative  - EBV pending  - RUQ US: cholelithiasis without choleycystitis, hepatosplenomegaly  - hydrated with 2L NS in ED, no further fluids warranted  - RUQ tenderness on exam  - improving symptoms  - continue to monitor    Lymphocytosis  Transaminitis  Fever of unknown origin  Patient presents with recurrent fevers, RUQ abdominal tenderness, N/V. Absolute lymphocytosis noted on admit, concerning for lymphoma/ leukemia/ EBV infection.   - Recurrent fevers throughout hospital stay  - WBC 13>   - LFTs elevated on admit, now down trending  - RUQ with cholelithiasis without cholecystitis, heptasplenomegaly  - Absolute lymphocytosis on admit  - Peripheral smear revealed Mild leukocytosis with relative and absolute lymphocytosis, rare circulating immature  granulocytes, and a subset of variably sized but predominantly large atypical cells with irregular nuclear contours, basophilic cytoplasm, and rare prominent nucleoli concerning for circulating lymphoma versus leukemia  - heme/onc consulted, appreciate assistance  - Flow cytometry ordered  - EBV pending; TB quant gold pending  - acute hep panel negative  - CT neck/chest/abdomen/pelvis with contrast with residual thymic tissue, otherwise no lymphadenopathy noted  - D-dimer elevated, suspect acute coagulapathy rather than clot  - ferritin elevated to 931  - , uric acid normal   - underwent bone marrow biopsy 9/11  - 9/11: Patient continues to spike fevers, now hypotensive. Giving 2L LR  - blood cultures, procal, lactic pending  - ID consulted per heme/onc recs, appreciate assistance      VTE Risk Mitigation (From admission, onward)         Ordered     IP VTE HIGH RISK PATIENT  Once      09/09/20 1720     Place ANDREW hose  Until discontinued      09/09/20 1720                Discharge Planning   MONIKA: 9/12/2020     Code Status: Full Code   Is the patient medically ready for discharge?:     Reason for patient still in hospital (select all that apply): Patient unstable, Patient trending condition, Laboratory test, Treatment and Consult recommendations  Discharge Plan A: Home with family                  Kassie Collier PA-C  Department of Hospital Medicine   [facility]

## 2020-09-11 NOTE — PROGRESS NOTES
Progress Note    SUBJECTIVE:   She had a fever last night, up to 101.6 F. Bone marrow biopsy completed this morning, which she tolerated well. No significant complaints noted after the procedure. We discussed CT scan revealing probable residual thymic tissue, otherwise no significant adenopathy.     Review of patient's allergies indicates:  No Known Allergies  Past Medical History:   Diagnosis Date    PCOS (polycystic ovarian syndrome)      Past Surgical History:   Procedure Laterality Date    TONSILLECTOMY      WRIST SURGERY       Family History   Problem Relation Age of Onset    Breast cancer Neg Hx     Colon cancer Neg Hx     Hypertension Neg Hx     Ovarian cancer Neg Hx     Stroke Neg Hx      Social History     Tobacco Use    Smoking status: Never Smoker   Substance Use Topics    Alcohol use: Yes    Drug use: No     Review of Systems   Constitutional: Positive for diaphoresis and fever. Negative for weight loss.   HENT: Negative for sore throat.    Eyes: Negative for blurred vision.   Respiratory: Negative for cough and shortness of breath.    Cardiovascular: Negative for chest pain and leg swelling.   Gastrointestinal: Positive for abdominal pain. Negative for blood in stool, constipation and vomiting.   Genitourinary: Negative for hematuria.   Musculoskeletal: Negative for back pain.   Skin: Negative for rash.   Neurological: Positive for headaches. Negative for loss of consciousness and weakness.     OBJECTIVE:     Vital Signs:  Temp:  [98.9 °F (37.2 °C)]   Pulse:  [88]   Resp:  [16-18]   BP: (112)/(71)   SpO2:  [97 %]     Physical Exam  Constitutional:       General: She is not in acute distress.     Appearance: Normal appearance. She is normal weight.   HENT:      Head: Normocephalic and atraumatic.      Nose: Nose normal.      Mouth/Throat:      Mouth: Mucous membranes are moist.      Pharynx: Oropharynx is clear.   Eyes:      General: No scleral icterus.     Extraocular Movements: Extraocular  movements intact.      Conjunctiva/sclera: Conjunctivae normal.      Pupils: Pupils are equal, round, and reactive to light.   Neck:      Musculoskeletal: Normal range of motion and neck supple. No neck rigidity.   Cardiovascular:      Rate and Rhythm: Normal rate and regular rhythm.      Pulses: Normal pulses.      Heart sounds: Normal heart sounds. No murmur. No friction rub. No gallop.    Pulmonary:      Effort: Pulmonary effort is normal. No respiratory distress.      Breath sounds: Normal breath sounds. No wheezing or rales.   Abdominal:      General: Abdomen is flat.      Palpations: Abdomen is soft.      Tenderness: There is abdominal tenderness.      Comments: Tenderness to palpation of RUQ, hepatomegaly   Musculoskeletal: Normal range of motion.         General: No swelling.   Skin:     General: Skin is warm and dry.      Coloration: Skin is not jaundiced.   Neurological:      General: No focal deficit present.      Mental Status: She is alert and oriented to person, place, and time. Mental status is at baseline.      Cranial Nerves: No cranial nerve deficit.       Laboratory:  CBC:   Recent Labs   Lab 09/11/20  0255   WBC 12.98*   RBC 4.32   HGB 12.1   HCT 38.6      MCV 89   MCH 28.0   MCHC 31.3*     BMP:   Recent Labs   Lab 09/11/20  0255   GLU 83      K 3.7      CO2 24   BUN 3*   CREATININE 0.7   CALCIUM 8.3*   MG 2.0     CMP:   Recent Labs   Lab 09/11/20  0255   GLU 83   CALCIUM 8.3*   ALBUMIN 2.7*   PROT 5.8*      K 3.7   CO2 24      BUN 3*   CREATININE 0.7   ALKPHOS 345*   *   *   BILITOT 0.5     LFTs:   Recent Labs   Lab 09/11/20  0255   *   *   ALKPHOS 345*   BILITOT 0.5   PROT 5.8*   ALBUMIN 2.7*     Coagulation: No results for input(s): LABPROT, INR, APTT in the last 168 hours.  Microbiology Results (last 7 days)     ** No results found for the last 168 hours. **            Diagnostic Results:    EXAMINATION:  CT SOFT TISSUE NECK WITH  CONTRAST     CLINICAL HISTORY:  lymphocytosis and hepatosplenomegaly, concern for malignancy;     TECHNIQUE:  Low dose axial images as well as sagittal and coronal reconstructions were performed from the skull base to the clavicles following the intravenous administration of 75 mL of Omnipaque 350.     COMPARISON:  None     FINDINGS:  Aerodigestive tract: Normal.     Lymph nodes: A few prominent cervical lymph nodes without evidence CT criteria of pathologic enlargement.     Salivary glands: Normal.     Thyroid: Normal.     Thoracic inlet: Better evaluated on same-day CT chest abdomen pelvis.     Vascular structures: Normal.     Orbits/paranasal sinuses/skull base: Normal.     Cervical spine: Straightening of the normal cervical lordosis.  Otherwise cervical spine is unremarkable.     Other findings: N/A     Impression:     No evidence suggest malignancy.     A few prominent cervical lymph nodes without CT evidence of pathologic enlargement.     Electronically signed by resident: Piotr Chavez  Date:                                            09/10/2020  Time:                                           23:57     Electronically signed by: Srinivasa Jolley MD  Date:                                            09/11/2020  Time:                                           08:19      EXAMINATION:  CT CHEST ABDOMEN PELVIS WITH CONTRAST (XPD)     CLINICAL HISTORY:  lymphocytosis and hepatosplenomegaly, cocnern for malignancy;     TECHNIQUE:  The patient was surveyed from the lung apices through the pelvis after the administration of 75 cc Omni 350 IV contrast.  Data was reconstructed for coronal, sagittal, and axial images.     COMPARISON:  Ultrasound 09/09/2020     FINDINGS:  CHEST:     Neck and thoracic soft tissues: unremarkable.     Aorta:A left sided aortic arch with 3 branch vessels.  The thoracic aorta maintains normal caliber, contour, and course.  No significant atherosclerotic calcification.     Heart: The heart  is not enlarged. No pericardial effusion .     Airways: The trachea is midline and proximal airways are patent.     Lungs/Pleura: Lungs are symmetrically expanded.  No evidence of consolidation, mass, significant pleural thickening, or pleural fluid.     Hilum/Mediastinum: 3.2 x 1.8 cm soft tissue mass within the anterior mediastinum likely representing residual thymic tissue.  Thymoma is considered much less likely.  No axillary or mediastinal lymph node enlargement.     Esophagus: The esophagus maintains a normal course and caliber.     ABDOMEN/PELVIS:     Liver: Liver is enlarged with normal attenuation.  No focal hepatic abnormality is seen.     Gallbladder/Biliary System: Calcified gallstone present within the lumen of the gallbladder.  No evidence to suggest cholecystitis.  No intrahepatic or extrahepatic biliary ductal dilatation is noted.     Stomach: Unremarkable     Spleen: Spleen is enlarged measuring 13.3 x 6.5 cm.     Pancreas: Unremarkable     Adrenal glands: Unremarkable     Renal and Urinary system: The kidneys are normal in size and location.They concentrate contrast appropriately.  No hydronephrosis.  The ureters appear normal in course and caliber without evidence of ureteral dilatation. The urinary bladder is unremarkable.     Reproductive: Unremarkable     Bowel: Visualized loops of the small and large bowel do not show significant obstruction or surrounding inflammation.  Appendix not definitively visualized however there is no evidence of inflammatory change in the expected area of the appendix to suggest appendicitis.     Peritoneum: No ascites, free fluid, or intraperitoneal free air.     Lymph Nodes: No pathologic prashanth enlargement in the abdomen or pelvis.     Aorta: the abdominal aorta is normal in course and caliber.  No significant atherosclerotic calcifications.     Bones: No evidence of acute fracture or osseus destructive process.     Soft tissues:  unremarkable.     Impression:     Cholelithiasis without evidence of cholecystitis.     Hepatosplenomegaly.     3.2 x 1.8 cm soft tissue mass in the anterior mediastinum likely representing residual thymic tissue.     Electronically signed by resident: Piotr Chavez  Date:                                            09/11/2020  Time:                                           00:01     Electronically signed by: Srinivasa Jolley MD  Date:                                            09/11/2020  Time:                                           08:18  ASSESSMENT/PLAN:       Plan:     Atypical Lymphocytosis  - WBC noted to be mildly elevated, absolute lymphocytosis of 8.5 K today  -  Peripheral smear revealed Mild leukocytosis with relative and absolute lymphocytosis, rare circulating immature granulocytes, and a subset of variably sized but predominantly large atypical cells with irregular nuclear contours, basophilic cytoplasm, and rare prominent nucleoli concerning for circulating lymphoma versus leukemia  - tender hepatomegaly 19 cm and spleen of 13 cm per initial Ultrasound   - ferritin elevated to 931  - , uric acid normal   - Flow Cytometry pending   - Viral hepatitis panel and EBV studies, TB quant gold pending; she will have ID eval as well given recurrent fevers  - CT neck/chest/ A&P with likely residual thymic tissue, otherwise no lymphadenopathy noted  - BM Biopsy completed today, will follow-up with pathology      If discharged today, we will arrange for follow-up with us in clinic and would see that she has follow-up with ID< if indicated. Discussed with patient. To be discussed with Dr. Doran.    Dilan Smith MD  Hematology/Oncology fellow, PGY VI  [facility]

## 2020-09-11 NOTE — SUBJECTIVE & OBJECTIVE
Interval History: Patient reports RUQ abdominal tenderness, fatigue, fevers, nausea, and dehydration. Spiked temperature 101.2 overnight, with leukocytosis of 12.98. Hypotensive to 71/46, will give 2L LR. Procal, lactic, Blood cultures ordered. Underwent bone marrow biopsy today. Quant gold pending for TB. Per heme/onc recs, ID consulted for recurrent fevers. AST/ALT down trending. Alk Phos stable. No evidence of cholecystitis on imaging.     Review of Systems   Constitutional: Positive for fatigue and fever. Negative for diaphoresis.   HENT: Negative for congestion and rhinorrhea.    Respiratory: Negative for cough and shortness of breath.    Cardiovascular: Negative for chest pain and leg swelling.   Gastrointestinal: Positive for abdominal pain and nausea. Negative for abdominal distention, diarrhea and vomiting.   Genitourinary: Negative for difficulty urinating, frequency, hematuria and urgency.   Musculoskeletal: Negative for arthralgias and back pain.   Neurological: Positive for weakness. Negative for syncope, numbness and headaches.   Psychiatric/Behavioral: Negative for agitation, behavioral problems, confusion and decreased concentration.     Objective:     Vital Signs (Most Recent):  Temp: 98.1 °F (36.7 °C) (09/11/20 1137)  Pulse: 95 (09/11/20 1137)  Resp: 16 (09/11/20 1137)  BP: (!) 71/46 (09/11/20 1137)  SpO2: 96 % (09/11/20 1137) Vital Signs (24h Range):  Temp:  [98 °F (36.7 °C)-101.6 °F (38.7 °C)] 98.1 °F (36.7 °C)  Pulse:  [] 95  Resp:  [16-18] 16  SpO2:  [96 %-98 %] 96 %  BP: ()/(46-71) 71/46     Weight: 76.3 kg (168 lb 3.4 oz)  Body mass index is 30.77 kg/m².  No intake or output data in the 24 hours ending 09/11/20 1151   Physical Exam  Vitals signs and nursing note reviewed.   Constitutional:       Appearance: Normal appearance.   HENT:      Head: Normocephalic and atraumatic.      Mouth/Throat:      Mouth: Mucous membranes are dry.      Pharynx: Oropharynx is clear.   Eyes:       Conjunctiva/sclera: Conjunctivae normal.      Pupils: Pupils are equal, round, and reactive to light.   Cardiovascular:      Rate and Rhythm: Normal rate and regular rhythm.      Pulses: Normal pulses.      Heart sounds: Normal heart sounds.   Pulmonary:      Effort: Pulmonary effort is normal.      Breath sounds: Normal breath sounds.   Abdominal:      General: Abdomen is flat. Bowel sounds are normal.      Palpations: Abdomen is soft.      Tenderness: There is abdominal tenderness in the right upper quadrant.   Musculoskeletal: Normal range of motion.         General: No swelling or tenderness.   Skin:     General: Skin is warm and dry.   Neurological:      General: No focal deficit present.      Mental Status: She is alert and oriented to person, place, and time.   Psychiatric:         Mood and Affect: Mood normal.         Behavior: Behavior normal.         Significant Labs:   Bilirubin:   Recent Labs   Lab 09/09/20  1229 09/10/20  0337 09/11/20  0255   BILITOT 0.8 0.6 0.5     CBC:   Recent Labs   Lab 09/09/20  1229 09/10/20  0337 09/11/20  0255   WBC 13.16* 11.31 12.98*   HGB 14.2 11.4* 12.1   HCT 44.7 35.9* 38.6    211 215     CMP:   Recent Labs   Lab 09/09/20  1229 09/10/20  0337 09/11/20  0255   * 139 139   K 3.7 3.6 3.7    108 109   CO2 23 24 24   GLU 87 82 83   BUN 9 7 3*   CREATININE 0.8 0.7 0.7   CALCIUM 8.9 8.0* 8.3*   PROT 7.1 5.5* 5.8*   ALBUMIN 3.3* 2.6* 2.7*   BILITOT 0.8 0.6 0.5   ALKPHOS 341* 293* 345*   * 196* 142*   * 446* 382*   ANIONGAP 9 7* 6*   EGFRNONAA >60.0 >60.0 >60.0     Magnesium:   Recent Labs   Lab 09/10/20  0337 09/11/20  0255   MG 1.9 2.0     POCT Glucose: No results for input(s): POCTGLUCOSE in the last 48 hours.    Significant Imaging: I have reviewed all pertinent imaging results/findings within the past 24 hours.

## 2020-09-11 NOTE — PROCEDURES
Bone marrow    Date/Time: 9/11/2020 11:09 AM  Performed by: Dilan Smith MD  Authorized by: Dilan Smith MD     Consent Done?:  Yes (Written)  Assistants?: No     I was present for the entire procedure.  Local anesthetic:  Lidocaine 2% without epinephrine  Aspiration?: Yes    Biopsy?: Yes    Number of Specimens::  1  Estimated blood loss (cc):  5   Patient tolerated the procedure well with no immediate complications.   Post-operative instructions were provided for the patient.   Patient was discharged and will follow up if any complications occur      Biopsy completed from R iliac crest. 1 core and 11 cc of aspirate was obtained.     Dilan Smith MD  Hematology/Oncology Fellow, PGY VI  [facility]

## 2020-09-11 NOTE — HPI
29 y/o F w/o significant PMhx who was referred to ED from urgent care with nausea, vomiting, RUQ abd pain, febrile x 3 days prior to presentation. Symptoms began ~ 1.5 weeks ago including fatigue. She was noted to have elevated LFT's and mild leukocytosis w/ lymphocytic predominance so she was transferred to [facility]. Hospital course c/b intermittent febrile episodes and hypotension. Abd US 9/9 w/ few gallstones measuring up to 2.5 cm but no evidence of acute cholecystitis. CT C/A/P w/ 3.2 x 1.8 cm soft tissue mass within the anterior mediastinum likely representing residual thymic tissue. Hematology/oncology performed bone marrow biopsy 9/11. ID consulted for fever and lymphocytosis.  Denies recent travel outside [place], sick contacts, sexual activity within past 6 months, exotic animal exposure (has a dog & a cat), recent medication changes, IVDU, prior STDs, excess alcohol intake.

## 2020-09-12 VITALS
RESPIRATION RATE: 18 BRPM | DIASTOLIC BLOOD PRESSURE: 66 MMHG | HEIGHT: 62 IN | WEIGHT: 168.19 LBS | SYSTOLIC BLOOD PRESSURE: 110 MMHG | BODY MASS INDEX: 30.95 KG/M2 | TEMPERATURE: 98 F | HEART RATE: 109 BPM | OXYGEN SATURATION: 98 %

## 2020-09-12 LAB
ALBUMIN SERPL BCP-MCNC: 2.5 G/DL (ref 3.5–5.2)
ALP SERPL-CCNC: 347 U/L (ref 55–135)
ALT SERPL W/O P-5'-P-CCNC: 311 U/L (ref 10–44)
ANION GAP SERPL CALC-SCNC: 10 MMOL/L (ref 8–16)
ANISOCYTOSIS BLD QL SMEAR: SLIGHT
AST SERPL-CCNC: 118 U/L (ref 10–40)
BASOPHILS # BLD AUTO: ABNORMAL K/UL (ref 0–0.2)
BASOPHILS NFR BLD: 1 % (ref 0–1.9)
BILIRUB SERPL-MCNC: 0.4 MG/DL (ref 0.1–1)
BODY SITE - BONE MARROW: NORMAL
BUN SERPL-MCNC: 7 MG/DL (ref 6–20)
BURR CELLS BLD QL SMEAR: ABNORMAL
CALCIUM SERPL-MCNC: 8.1 MG/DL (ref 8.7–10.5)
CHLORIDE SERPL-SCNC: 106 MMOL/L (ref 95–110)
CLINICAL DIAGNOSIS - BONE MARROW: NORMAL
CO2 SERPL-SCNC: 23 MMOL/L (ref 23–29)
CREAT SERPL-MCNC: 0.7 MG/DL (ref 0.5–1.4)
DIFFERENTIAL METHOD: ABNORMAL
EBV DNA BY PCR: 885 IU/ML
EBV VCA IGG SER QL IA: NEGATIVE
EBV VCA IGM SER QL IA: POSITIVE
EOSINOPHIL # BLD AUTO: ABNORMAL K/UL (ref 0–0.5)
EOSINOPHIL NFR BLD: 0 % (ref 0–8)
ERYTHROCYTE [DISTWIDTH] IN BLOOD BY AUTOMATED COUNT: 13.6 % (ref 11.5–14.5)
EST. GFR  (AFRICAN AMERICAN): >60 ML/MIN/1.73 M^2
EST. GFR  (NON AFRICAN AMERICAN): >60 ML/MIN/1.73 M^2
FLOW CYTOMETRY ANTIBODIES ANALYZED - BONE MARROW: NORMAL
FLOW CYTOMETRY COMMENT - BONE MARROW: NORMAL
FLOW CYTOMETRY INTERPRETATION - BONE MARROW: NORMAL
GLUCOSE SERPL-MCNC: 87 MG/DL (ref 70–110)
HCT VFR BLD AUTO: 39.1 % (ref 37–48.5)
HGB BLD-MCNC: 12 G/DL (ref 12–16)
HYPOCHROMIA BLD QL SMEAR: ABNORMAL
IMM GRANULOCYTES # BLD AUTO: ABNORMAL K/UL (ref 0–0.04)
IMM GRANULOCYTES NFR BLD AUTO: ABNORMAL % (ref 0–0.5)
LYMPHOCYTES # BLD AUTO: ABNORMAL K/UL (ref 1–4.8)
LYMPHOCYTES NFR BLD: 66 % (ref 18–48)
MAGNESIUM SERPL-MCNC: 1.8 MG/DL (ref 1.6–2.6)
MCH RBC QN AUTO: 27.8 PG (ref 27–31)
MCHC RBC AUTO-ENTMCNC: 30.7 G/DL (ref 32–36)
MCV RBC AUTO: 91 FL (ref 82–98)
METAMYELOCYTES NFR BLD MANUAL: 2 %
MONOCYTES # BLD AUTO: ABNORMAL K/UL (ref 0.3–1)
MONOCYTES NFR BLD: 4 % (ref 4–15)
MYELOCYTES NFR BLD MANUAL: 2 %
NEUTROPHILS NFR BLD: 23 % (ref 38–73)
NEUTS BAND NFR BLD MANUAL: 2 %
NRBC BLD-RTO: 0 /100 WBC
OVALOCYTES BLD QL SMEAR: ABNORMAL
PHOSPHATE SERPL-MCNC: 3.6 MG/DL (ref 2.7–4.5)
PLATELET # BLD AUTO: 234 K/UL (ref 150–350)
PLATELET BLD QL SMEAR: ABNORMAL
PMV BLD AUTO: 10.5 FL (ref 9.2–12.9)
POIKILOCYTOSIS BLD QL SMEAR: SLIGHT
POLYCHROMASIA BLD QL SMEAR: ABNORMAL
POTASSIUM SERPL-SCNC: 3.9 MMOL/L (ref 3.5–5.1)
PROT SERPL-MCNC: 5.9 G/DL (ref 6–8.4)
RBC # BLD AUTO: 4.32 M/UL (ref 4–5.4)
SMUDGE CELLS BLD QL SMEAR: PRESENT
SODIUM SERPL-SCNC: 139 MMOL/L (ref 136–145)
WBC # BLD AUTO: 14.45 K/UL (ref 3.9–12.7)

## 2020-09-12 PROCEDURE — 36415 COLL VENOUS BLD VENIPUNCTURE: CPT

## 2020-09-12 PROCEDURE — 99239 PR HOSPITAL DISCHARGE DAY,>30 MIN: ICD-10-PCS | Mod: ,,, | Performed by: PHYSICIAN ASSISTANT

## 2020-09-12 PROCEDURE — 94761 N-INVAS EAR/PLS OXIMETRY MLT: CPT

## 2020-09-12 PROCEDURE — 86703 HIV-1/HIV-2 1 RESULT ANTBDY: CPT

## 2020-09-12 PROCEDURE — 83735 ASSAY OF MAGNESIUM: CPT

## 2020-09-12 PROCEDURE — 80053 COMPREHEN METABOLIC PANEL: CPT

## 2020-09-12 PROCEDURE — 85007 BL SMEAR W/DIFF WBC COUNT: CPT

## 2020-09-12 PROCEDURE — 84100 ASSAY OF PHOSPHORUS: CPT

## 2020-09-12 PROCEDURE — 99239 HOSP IP/OBS DSCHRG MGMT >30: CPT | Mod: ,,, | Performed by: PHYSICIAN ASSISTANT

## 2020-09-12 PROCEDURE — 87536 HIV-1 QUANT&REVRSE TRNSCRPJ: CPT

## 2020-09-12 PROCEDURE — 25000003 PHARM REV CODE 250: Performed by: PHYSICIAN ASSISTANT

## 2020-09-12 PROCEDURE — 85027 COMPLETE CBC AUTOMATED: CPT

## 2020-09-12 PROCEDURE — 86704 HEP B CORE ANTIBODY TOTAL: CPT

## 2020-09-12 RX ORDER — ONDANSETRON 4 MG/1
8 TABLET, ORALLY DISINTEGRATING ORAL EVERY 6 HOURS PRN
Qty: 30 TABLET | Refills: 0 | Status: SHIPPED | OUTPATIENT
Start: 2020-09-12

## 2020-09-12 RX ADMIN — CETIRIZINE HYDROCHLORIDE 5 MG: 5 TABLET ORAL at 08:09

## 2020-09-12 NOTE — DISCHARGE SUMMARY
[facility] Hospital Medicine  Discharge Summary      Patient Name: Dorothy Sweeney  MRN: [id number]  Admission Date: 9/9/2020  Hospital Length of Stay: 1 days  Discharge Date and Time: 9/12/2020  1:57 PM  Attending Physician: Erika att. providers found   Discharging Provider: Kassie Collier PA-C  Primary Care Provider: Primary Doctor No  Hospital Medicine Team: [facility] HOSP MED F Kassie Collier PA-C    HPI:   31 y/o F sent from urgent care with c/o nausea and vomiting past 5 days ago, RUQ tenderness, abdominal discomfort, febrile x 3 days with peak temp of 103.2.  She was noted to have elevated LFT's and lymphocytosis so she was transferred to [facility] ED for acute Hepatitis work up.  She is no longer N/V/ febrile.  She did not have diarrhea or constipation.  She had a hard time keeping fluids/ food down, indeterminate weight loss.  Of note, she has only traveled to [place] 9/2/20 of recent, [place] was in 2016 per notes.  She has not socialized outside of the house and has only ordered take out.  No one else in the home is sick.  She has been sleeping in her brother's room d/t Mold remediation in her bedroom, she denies any upper respiratory symptoms.  Her last BM was yesterday and nml color/ consistency. She reports fatigue and some mild shortness of breath on exertion.  She denies any LH, dizziness, CP, blurred vision, HA, heat/ cold sensitivities, dysuria, frequency, urgency, flank pain. Denies vaginal discharge, irritation.  She reports mild cough, congestion over past few days as well. Had negative covid test.    PMH: Nephrolithiasis, PCOS  Meds: OCP only  Sexually active: uses condoms. LMP 1 week ago lighter than usual. Bled for 1 day. Last nml menstrual cycle was 1 month ago.  LABS: Granulocytes 30, Lymph 63, Mono 1.0,  , , Lipase 25, Alb 3.3, TB 0.8, TP 7.1, HCG negative  Hepatic US: pending read     Admitted to hospital medicine for acute hepatitis of unknown etiology     * No  surgery found *      Hospital Course:   Ms. Sweeney was admitted to observation for transaminitis and fevers. RUQ ultrasound with hepatosplenomegaly, cholelithiasis without cholecystitis. Afebrile with leukocytosis on admit. Absolute Lymphocytosis noted. Peripheral smear revealed Mild leukocytosis with relative and absolute lymphocytosis, rare circulating immature granulocytes, and a subset of variably sized but predominantly large atypical cells with irregular nuclear contours, basophilic cytoplasm, and rare prominent nucleoli concerning for circulating lymphoma versus leukemia. Concerning for EBV infection, pending. Acute hep negative.  Heme/onc consulted for lymphocytosis and hepatosplenomegaly. Ferritin elevated to 931, , uric acid normal. D-dimer elevated. ALNOZO negative. Flow cytometry pending. TB quant gold pending given recurrent fevers/ hepatomegaly. Patient intermittently spiking fevers during stay with leukocytosis. CT neck/chest/abdomen/pelvis with likely residual thymic tissue, otherwise no lymphadenopathy noted. Lactic acid WNL, procal negative, blood cultures ordered. Patient underwent bone marrow biopsy 9/11, results pending. ID consulted given recurrent fevers, appreciate assistance. Given acuity of symptoms, viral infection possible, agree with r/o lymphoma leukemia. EBV IgM positive, IgG negative, EBV DNA . Patient informed of positive results, treatment supportive care. Patient discharged. Referrals to heme/onc and ID given. Patient to have phone visit next week for biopsy results as she lives in [place]. Patient verbalized understanding. All questions answered.     Consults:   Consults (From admission, onward)        Status Ordering Provider     Inpatient consult to Hematology/Oncology  Once     Provider:  (Not yet assigned)    Completed LENO ZARAGOZA     Inpatient consult to Infectious Diseases  Once     Provider:  (Not yet assigned)    Completed OSWALDO PARKER           Lymphocytosis  Transaminitis  Fever of unknown origin  Patient presents with recurrent fevers, RUQ abdominal tenderness, N/V. Absolute lymphocytosis noted on admit, concerning for lymphoma/ leukemia/ EBV infection.   - Recurrent fevers throughout hospital stay  - WBC 13> 11>12>14  - LFTs elevated on admit, now down trending  - RUQ with cholelithiasis without cholecystitis, heptasplenomegaly  - Absolute lymphocytosis on admit  - Peripheral smear revealed Mild leukocytosis with relative and absolute lymphocytosis, rare circulating immature granulocytes, and a subset of variably sized but predominantly large atypical cells with irregular nuclear contours, basophilic cytoplasm, and rare prominent nucleoli concerning for circulating lymphoma versus leukemia  - EBV pending  - heme/onc consulted, appreciate assistance  - Flow cytometry ordered  - TB quant gold pending  - acute hep panel negative  - CT neck/chest/abdomen/pelvis with contrast with residual thymic tissue, otherwise no lymphadenopathy noted  - D-dimer elevated, suspect acute coagulapathy rather than clot  - ferritin elevated to 931  - , uric acid normal   - ALONZO negative  - underwent bone marrow biopsy 9/11  - Patient continues to spike fevers, now hypotensive. Giving 2L LR with resolution  - blood cultures NGTD, procal WNL , lactic WNL- low concern for bacterial infection  - ID consulted per heme/onc recs, appreciate assistance  - agree with likely viral infection, but R/o leukemia/ lymphoma  - HIV pending, low suspicion  - EBV IgM positive, IgG negative, EBV    - treatment with supportive care  - Heme/onc to follow up with patient for bone marrow results in 1 week  - amb ref to heme/onc and ID given  - patient verbalized understanding. All questions answered      Final Active Diagnoses:    Diagnosis Date Noted POA    PRINCIPAL PROBLEM:  Transaminitis [R74.0] 09/09/2020 Yes    Fever of unknown origin [R50.9] 09/11/2020 Yes     Lymphocytosis [D72.820] 09/10/2020 Yes      Problems Resolved During this Admission:       Discharged Condition: good    Disposition: Home or Self Care    Follow Up:    Patient Instructions:      Ambulatory referral/consult to Hematology / Oncology   Standing Status: Future   Referral Priority: Routine Referral Type: Consultation   Referral Reason: Specialty Services Required   Requested Specialty: Hematology and Oncology   Number of Visits Requested: 1     Ambulatory referral/consult to Infectious Disease   Standing Status: Future   Referral Priority: Routine Referral Type: Consultation   Referral Reason: Specialty Services Required   Requested Specialty: Infectious Diseases   Number of Visits Requested: 1     Notify your health care provider if you experience any of the following:  temperature >100.4     Notify your health care provider if you experience any of the following:  severe uncontrolled pain     Notify your health care provider if you experience any of the following:  persistent nausea and vomiting or diarrhea     Activity as tolerated       Significant Diagnostic Studies: Labs:   CMP   Recent Labs   Lab 09/11/20 0255 09/12/20  0506    139   K 3.7 3.9    106   CO2 24 23   GLU 83 87   BUN 3* 7   CREATININE 0.7 0.7   CALCIUM 8.3* 8.1*   PROT 5.8* 5.9*   ALBUMIN 2.7* 2.5*   BILITOT 0.5 0.4   ALKPHOS 345* 347*   * 118*   * 311*   ANIONGAP 6* 10   ESTGFRAFRICA >60.0 >60.0   EGFRNONAA >60.0 >60.0    and CBC   Recent Labs   Lab 09/11/20 0255 09/12/20  0506   WBC 12.98* 14.45*   HGB 12.1 12.0   HCT 38.6 39.1    234     Microbiology: Blood Culture No results found for: LABBLOO    Pending Diagnostic Studies:     Procedure Component Value Units Date/Time    CMV DNA, quantitative, PCR [[id number]] Collected: 09/12/20 0506    Order Status: Sent Lab Status: In process Updated: 09/12/20 0628    Specimen: Blood     Berny-Barr Virus antibody panel [[id number]] Collected: 09/09/20 7146     Order Status: Sent Lab Status: In process Updated: 09/09/20 1749    Specimen: Blood     Berny-Barr virus early antigen antibody, IgG [[id number]] Collected: 09/10/20 1036    Order Status: Sent Lab Status: In process Updated: 09/10/20 1044    Specimen: Blood     HIV 1/2 Ag/Ab (4th Gen) [[id number]] Collected: 09/12/20 0506    Order Status: Sent Lab Status: In process Updated: 09/12/20 0628    Specimen: Blood     HIV RNA, quantitative, PCR [[id number]] Collected: 09/12/20 0506    Order Status: Sent Lab Status: In process Updated: 09/12/20 0628    Specimen: Blood     Heme Disorders DNA/RNA Hold, Bone Marrow [[id number]] Collected: 09/11/20 1027    Order Status: Sent Lab Status: In process Updated: 09/11/20 1231    Specimen: Bone Marrow     Hepatitis B core antibody, total [[id number]] Collected: 09/12/20 0506    Order Status: Sent Lab Status: In process Updated: 09/12/20 0628    Specimen: Blood     Quantiferon Gold TB [[id number]] Collected: 09/11/20 0830    Order Status: Sent Lab Status: In process Updated: 09/11/20 0850    Specimen: Blood     Specimen to Pathology, Bone Marrow Aspiration/Biopsy [[id number]] Collected: 09/11/20 1028    Order Status: Sent Lab Status: In process Updated: 09/11/20 1209         Medications:  Reconciled Home Medications:      Medication List      START taking these medications    ondansetron 4 MG Tbdl  Commonly known as: ZOFRAN-ODT  Take 2 tablets (8 mg total) by mouth every 6 (six) hours as needed.        CONTINUE taking these medications    drospirenone-ethinyl estradioL 3-0.03 mg per tablet  Commonly known as: OCELLA  Take 1 tablet by mouth once daily.     LILLOW (28) 0.15-0.03 mg per tablet  Generic drug: levonorgestrel-ethinyl estradiol  Take 1 tablet by mouth once daily.            Indwelling Lines/Drains at time of discharge:   Lines/Drains/Airways     None                 Time spent on the discharge of patient: 45 minutes  Patient was seen and examined on the date of discharge and  determined to be suitable for discharge.         Kassie Collier PA-C  Department of Hospital Medicine  [facility]

## 2020-09-12 NOTE — CONSULTS
[facility]  Infectious Disease  Consult Note    Patient Name: Dorothy Sweeney  MRN: [id number]  Admission Date: 9/9/2020  Hospital Length of Stay: 0 days  Attending Physician: Anthony Wells MD  Primary Care Provider: Primary Doctor No     Isolation Status: No active isolations    Patient information was obtained from patient, parent, past medical records and ER records.      Inpatient consult to Infectious Diseases  Consult performed by: Rod Tay MD  Consult ordered by: Kassie Collier PA-C        Assessment/Plan:     Fever of unknown origin  31 y/o F w/o significant PMhx who was referred to ED from urgent care with nausea, vomiting, RUQ abd pain, febrile x 3 days prior to presentation. Symptoms began ~ 1.5 weeks ago including fatigue. She was noted to have elevated LFT's and mild leukocytosis w/ lymphocytic predominance so she was transferred to [facility]. Hospital course c/b intermittent febrile episodes and hypotension. Abd US 9/9 w/ few gallstones measuring up to 2.5 cm but no evidence of acute cholecystitis. CT C/A/P w/ 3.2 x 1.8 cm soft tissue mass within the anterior mediastinum likely representing residual thymic tissue. Hematology/oncology performed bone marrow biopsy 9/11. ID consulted for fever and lymphocytosis.    Recommendations  Agree with holding antibiotics and monitor clinically  Patient and mother requesting labs be drawn to minimize blood draws  F/u HIV Ag/Ab, HIV quant (as Ag/Ab would be negative in acute infection), hep B core total, CMV and EBV quant (ordered for AM)        Thank you for your consult. I will follow-up with patient. Please contact us if you have any additional questions.    Rod Tay MD  Infectious Disease  [facility]    Subjective:     Principal Problem: Transaminitis    HPI: 31 y/o F w/o significant PMhx who was referred to ED from urgent care with nausea, vomiting, RUQ abd pain, febrile x 3 days prior to presentation.  Symptoms began ~ 1.5 weeks ago including fatigue. She was noted to have elevated LFT's and mild leukocytosis w/ lymphocytic predominance so [facility] Hospital course c/b intermittent febrile episodes and hypotension. Abd US 9/9 w/ few gallstones measuring up to 2.5 cm but no evidence of acute cholecystitis. CT C/A/P w/ 3.2 x 1.8 cm soft tissue mass within the anterior mediastinum likely representing residual thymic tissue. Hematology/oncology performed bone marrow biopsy 9/11. ID consulted for fever and lymphocytosis.  Denies recent travel outside [place], sick contacts, sexual activity within past 6 months, exotic animal exposure (has a dog & a cat), recent medication changes, IVDU, prior STDs, excess alcohol intake.    Past Medical History:   Diagnosis Date    PCOS (polycystic ovarian syndrome)        Past Surgical History:   Procedure Laterality Date    TONSILLECTOMY      WRIST SURGERY         Review of patient's allergies indicates:  No Known Allergies    Medications:  Facility-Administered Medications Prior to Admission   Medication    [COMPLETED] promethazine injection 25 mg    [DISCONTINUED] ondansetron disintegrating tablet 4 mg     Medications Prior to Admission   Medication Sig    levonorgestrel-ethinyl estradiol (LILLOW, 28,) 0.15-0.03 mg per tablet Take 1 tablet by mouth once daily.    drospirenone-ethinyl estradiol (OCELLA) 3-0.03 mg per tablet Take 1 tablet by mouth once daily.     Antibiotics (From admission, onward)    None        Antifungals (From admission, onward)    None        Antivirals (From admission, onward)    None           Immunization History   Administered Date(s) Administered    Hepatitis A, Adult 01/15/2016    Influenza - Quadrivalent - PF *Preferred* (6 months and older) 01/15/2016       Family History     None        Social History     Socioeconomic History    Marital status: Single     Spouse name: Not on file    Number of children: Not on file    Years of  education: Not on file    Highest education level: Not on file   Occupational History    Not on file   Social Needs    Financial resource strain: Not on file    Food insecurity     Worry: Not on file     Inability: Not on file    Transportation needs     Medical: Not on file     Non-medical: Not on file   Tobacco Use    Smoking status: Never Smoker   Substance and Sexual Activity    Alcohol use: Yes    Drug use: No    Sexual activity: Not on file   Lifestyle    Physical activity     Days per week: Not on file     Minutes per session: Not on file    Stress: Not on file   Relationships    Social connections     Talks on phone: Not on file     Gets together: Not on file     Attends Sikh service: Not on file     Active member of club or organization: Not on file     Attends meetings of clubs or organizations: Not on file     Relationship status: Not on file   Other Topics Concern    Not on file   Social History Narrative    Not on file     Review of Systems   Constitutional: Positive for fatigue and fever.   HENT: Negative for congestion.    Eyes: Negative for photophobia.   Respiratory: Negative for cough and shortness of breath.    Cardiovascular: Negative for chest pain and leg swelling.   Gastrointestinal: Positive for abdominal pain and nausea. Negative for abdominal distention, diarrhea and vomiting.   Genitourinary: Negative for difficulty urinating and frequency.   Musculoskeletal: Negative for arthralgias, back pain and myalgias.   Skin: Negative for rash.   Neurological: Positive for weakness. Negative for headaches.   Psychiatric/Behavioral: Negative for agitation and confusion.     Objective:     Vital Signs (Most Recent):  Temp: 97.8 °F (36.6 °C) (09/11/20 1529)  Pulse: 110 (09/11/20 1529)  Resp: 16 (09/11/20 1529)  BP: 115/68 (09/11/20 1529)  SpO2: 98 % (09/11/20 1529) Vital Signs (24h Range):  Temp:  [97.8 °F (36.6 °C)-101.6 °F (38.7 °C)] 97.8 °F (36.6 °C)  Pulse:  [] 110  Resp:   [16-18] 16  SpO2:  [96 %-98 %] 98 %  BP: ()/(46-71) 115/68     Weight: 76.3 kg (168 lb 3.4 oz)  Body mass index is 30.77 kg/m².    Estimated Creatinine Clearance: 113.4 mL/min (based on SCr of 0.7 mg/dL).    Physical Exam  Vitals signs reviewed.   Constitutional:       General: She is not in acute distress.     Appearance: She is not ill-appearing or toxic-appearing.   HENT:      Head: Normocephalic and atraumatic.      Mouth/Throat:      Mouth: Mucous membranes are dry.      Pharynx: Oropharynx is clear.   Eyes:      General: No scleral icterus.     Conjunctiva/sclera: Conjunctivae normal.   Neck:      Musculoskeletal: No neck rigidity.   Cardiovascular:      Rate and Rhythm: Normal rate and regular rhythm.      Pulses: Normal pulses.      Heart sounds: Normal heart sounds.   Pulmonary:      Effort: Pulmonary effort is normal.      Breath sounds: Normal breath sounds.   Abdominal:      General: Abdomen is flat. Bowel sounds are normal.      Palpations: Abdomen is soft.      Tenderness: There is abdominal tenderness in the right upper quadrant.   Musculoskeletal: Normal range of motion.         General: No swelling or tenderness.   Skin:     General: Skin is warm and dry.   Neurological:      General: No focal deficit present.      Mental Status: She is alert and oriented to person, place, and time.   Psychiatric:         Mood and Affect: Mood normal.         Behavior: Behavior normal.         Significant Labs:   Blood Culture: No results for input(s): LABBLOO in the last 4320 hours.  CBC:   Recent Labs   Lab 09/10/20  0337 09/11/20  0255   WBC 11.31 12.98*   HGB 11.4* 12.1   HCT 35.9* 38.6    215     CMP:   Recent Labs   Lab 09/10/20  0337 09/11/20  0255    139   K 3.6 3.7    109   CO2 24 24   GLU 82 83   BUN 7 3*   CREATININE 0.7 0.7   CALCIUM 8.0* 8.3*   PROT 5.5* 5.8*   ALBUMIN 2.6* 2.7*   BILITOT 0.6 0.5   ALKPHOS 293* 345*   * 142*   * 382*   ANIONGAP 7* 6*   EGFRNONAA  >60.0 >60.0     Microbiology Results (last 7 days)     Procedure Component Value Units Date/Time    Blood culture [[id number]] Collected: 09/11/20 1151    Order Status: Sent Specimen: Blood from Antecubital, Right Arm Updated: 09/11/20 1333    Blood culture [[id number]] Collected: 09/11/20 1151    Order Status: Sent Specimen: Blood Updated: 09/11/20 1333          Significant Imaging: I have reviewed all pertinent imaging results/findings within the past 24 hours.

## 2020-09-12 NOTE — ASSESSMENT & PLAN NOTE
Transaminitis  Fever of unknown origin  Patient presents with recurrent fevers, RUQ abdominal tenderness, N/V. Absolute lymphocytosis noted on admit, concerning for lymphoma/ leukemia/ EBV infection.   - Recurrent fevers throughout hospital stay  - WBC 13> 11>12>14  - LFTs elevated on admit, now down trending  - RUQ with cholelithiasis without cholecystitis, heptasplenomegaly  - Absolute lymphocytosis on admit  - Peripheral smear revealed Mild leukocytosis with relative and absolute lymphocytosis, rare circulating immature granulocytes, and a subset of variably sized but predominantly large atypical cells with irregular nuclear contours, basophilic cytoplasm, and rare prominent nucleoli concerning for circulating lymphoma versus leukemia  - EBV pending  - heme/onc consulted, appreciate assistance  - Flow cytometry ordered  - TB quant gold pending  - acute hep panel negative  - CT neck/chest/abdomen/pelvis with contrast with residual thymic tissue, otherwise no lymphadenopathy noted  - D-dimer elevated, suspect acute coagulapathy rather than clot  - ferritin elevated to 931  - , uric acid normal   - ALONZO negative  - underwent bone marrow biopsy 9/11  - Patient continues to spike fevers, now hypotensive. Giving 2L LR with resolution  - blood cultures NGTD, procal WNL , lactic WNL- low concern for bacterial infection  - ID consulted per heme/onc recs, appreciate assistance  - agree with likely viral infection, but R/o leukemia/ lymphoma  - HIV pending, low suspicion  - EBV IgM positive, IgG negative, EBV    - treatment with supportive care  - Heme/onc to follow up with patient for bone marrow results in 1 week  - amb ref to heme/onc and ID given  - patient verbalized understanding. All questions answered

## 2020-09-14 DIAGNOSIS — B27.00 ACUTE EPSTEIN BARR VIRUS (EBV) INFECTION: ICD-10-CM

## 2020-09-14 DIAGNOSIS — R74.01 TRANSAMINITIS: ICD-10-CM

## 2020-09-14 DIAGNOSIS — D72.820 LYMPHOCYTOSIS: Primary | ICD-10-CM

## 2020-09-14 LAB
EBV EA IGG SER-ACNC: <5 U/ML
EBV EA IGG SER-ACNC: <5 U/ML
EBV NA IGG SER-ACNC: <3 U/ML
EBV VCA IGG SER-ACNC: <10 U/ML
EBV VCA IGM SER-ACNC: 38.2 U/ML
GAMMA INTERFERON BACKGROUND BLD IA-ACNC: 0.89 IU/ML
HBV CORE AB SERPL QL IA: NEGATIVE
HBV CORE AB SERPL QL IA: NEGATIVE
HIV 1+2 AB+HIV1 P24 AG SERPL QL IA: NEGATIVE
HIV UQ DATE RECEIVED: NORMAL
HIV UQ DATE REPORTED: NORMAL
HIV1 RNA # SERPL NAA+PROBE: <40 COPIES/ML
HIV1 RNA SERPL NAA+PROBE-LOG#: <1.6 LOG (10) COPIES/ML
HIV1 RNA SERPL QL NAA+PROBE: NOT DETECTED
M TB IFN-G CD4+ BCKGRND COR BLD-ACNC: 0.21 IU/ML
MITOGEN IGNF BCKGRD COR BLD-ACNC: 6.76 IU/ML
TB GOLD PLUS: NEGATIVE
TB2 - NIL: 0.08 IU/ML

## 2020-09-15 ENCOUNTER — TELEPHONE (OUTPATIENT)
Dept: INFECTIOUS DISEASES | Facility: CLINIC | Age: 30
End: 2020-09-15

## 2020-09-15 LAB
CMV DNA SERPL NAA+PROBE-ACNC: NORMAL IU/ML
DNA/RNA EXTRACT AND HOLD RESULT: NORMAL
DNA/RNA EXTRACTION: NORMAL
EXHR SPECIMEN TYPE: NORMAL

## 2020-09-15 NOTE — TELEPHONE ENCOUNTER
Informed patient of negative infectious labs (HIV, Hep B core ab total and surface Ag, quant gold, Hep A, CMV) except for EBV. Based on EBV studies informed her this is consistent w/ acute EBV infection, that there is no treatment, only supportive care. Patient states she is feeling much better than when she was in the hospital. Informed her she can cancel her ID f/u appointment unless she wishes to keep it and that she can reach out at any time.

## 2020-09-16 LAB
BACTERIA BLD CULT: NORMAL
BACTERIA BLD CULT: NORMAL

## 2020-09-17 ENCOUNTER — LAB VISIT (OUTPATIENT)
Dept: LAB | Facility: HOSPITAL | Age: 30
End: 2020-09-17
Attending: STUDENT IN AN ORGANIZED HEALTH CARE EDUCATION/TRAINING PROGRAM
Payer: COMMERCIAL

## 2020-09-17 DIAGNOSIS — R74.01 TRANSAMINITIS: ICD-10-CM

## 2020-09-17 DIAGNOSIS — D72.820 LYMPHOCYTOSIS: ICD-10-CM

## 2020-09-17 LAB
ALBUMIN SERPL BCP-MCNC: 2.9 G/DL (ref 3.5–5.2)
ALL (BM) DISCLAIMER: NORMAL
ALL (BM) RELEASED BY: NORMAL
ALL (BM) RESULT SUMMARY: NORMAL
ALL (BM) RESULT TABLE: NORMAL
ALP SERPL-CCNC: 270 U/L (ref 55–135)
ALT SERPL W/O P-5'-P-CCNC: 239 U/L (ref 10–44)
ANION GAP SERPL CALC-SCNC: 10 MMOL/L (ref 8–16)
AST SERPL-CCNC: 117 U/L (ref 10–40)
BASOPHILS # BLD AUTO: 0.14 K/UL (ref 0–0.2)
BASOPHILS NFR BLD: 1.3 % (ref 0–1.9)
BILIRUB SERPL-MCNC: 0.5 MG/DL (ref 0.1–1)
BUN SERPL-MCNC: 11 MG/DL (ref 6–20)
CALCIUM SERPL-MCNC: 8.6 MG/DL (ref 8.7–10.5)
CHLORIDE SERPL-SCNC: 104 MMOL/L (ref 95–110)
CHROM BANDING METHOD: NORMAL
CHROMOSOME ANALYSIS BM ADDITIONAL INFORMATION: NORMAL
CHROMOSOME ANALYSIS BM RELEASED BY: NORMAL
CHROMOSOME ANALYSIS BM RESULT SUMMARY: NORMAL
CLINICAL CYTOGENETICIST REVIEW: NORMAL
CO2 SERPL-SCNC: 24 MMOL/L (ref 23–29)
COMMENT: NORMAL
CREAT SERPL-MCNC: 0.7 MG/DL (ref 0.5–1.4)
DIFFERENTIAL METHOD: ABNORMAL
EOSINOPHIL # BLD AUTO: 0 K/UL (ref 0–0.5)
EOSINOPHIL NFR BLD: 0.2 % (ref 0–8)
ERYTHROCYTE [DISTWIDTH] IN BLOOD BY AUTOMATED COUNT: 14.4 % (ref 11.5–14.5)
EST. GFR  (AFRICAN AMERICAN): >60 ML/MIN/1.73 M^2
EST. GFR  (NON AFRICAN AMERICAN): >60 ML/MIN/1.73 M^2
FBALB INTERPRETATION: NORMAL
FBALB REASON FOR REFERRAL: NORMAL
FBALB RESULT: NORMAL
FBALB SPECIMEN: NORMAL
FINAL PATHOLOGIC DIAGNOSIS: NORMAL
GLUCOSE SERPL-MCNC: 104 MG/DL (ref 70–110)
GROSS: NORMAL
HCT VFR BLD AUTO: 42.4 % (ref 37–48.5)
HGB BLD-MCNC: 12.8 G/DL (ref 12–16)
IMM GRANULOCYTES # BLD AUTO: 0.07 K/UL (ref 0–0.04)
IMM GRANULOCYTES NFR BLD AUTO: 0.6 % (ref 0–0.5)
KARYOTYP MAR: NORMAL
LYMPHOCYTES # BLD AUTO: 7.7 K/UL (ref 1–4.8)
LYMPHOCYTES NFR BLD: 70.3 % (ref 18–48)
Lab: NORMAL
MCH RBC QN AUTO: 27.5 PG (ref 27–31)
MCHC RBC AUTO-ENTMCNC: 30.2 G/DL (ref 32–36)
MCV RBC AUTO: 91 FL (ref 82–98)
MICROSCOPIC EXAM: NORMAL
MONOCYTES # BLD AUTO: 0.6 K/UL (ref 0.3–1)
MONOCYTES NFR BLD: 5.2 % (ref 4–15)
NEUTROPHILS # BLD AUTO: 2.5 K/UL (ref 1.8–7.7)
NEUTROPHILS NFR BLD: 22.4 % (ref 38–73)
NRBC BLD-RTO: 0 /100 WBC
PLATELET # BLD AUTO: 223 K/UL (ref 150–350)
PMV BLD AUTO: 10.8 FL (ref 9.2–12.9)
POTASSIUM SERPL-SCNC: 4.1 MMOL/L (ref 3.5–5.1)
PROT SERPL-MCNC: 6.4 G/DL (ref 6–8.4)
RBC # BLD AUTO: 4.66 M/UL (ref 4–5.4)
REASON FOR REFERRAL (NARRATIVE): NORMAL
REF LAB TEST METHOD: NORMAL
REF LAB TEST METHOD: NORMAL
SERVICE CMNT-IMP: NORMAL
SODIUM SERPL-SCNC: 138 MMOL/L (ref 136–145)
SPECIMEN SOURCE: NORMAL
SPECIMEN SOURCE: NORMAL
SPECIMEN: NORMAL
SUPPLEMENTAL DIAGNOSIS: NORMAL
WBC # BLD AUTO: 10.98 K/UL (ref 3.9–12.7)

## 2020-09-17 PROCEDURE — 36415 COLL VENOUS BLD VENIPUNCTURE: CPT | Mod: PO

## 2020-09-17 PROCEDURE — 80053 COMPREHEN METABOLIC PANEL: CPT

## 2020-09-17 PROCEDURE — 85025 COMPLETE CBC W/AUTO DIFF WBC: CPT

## 2020-09-29 ENCOUNTER — TELEPHONE (OUTPATIENT)
Dept: HEMATOLOGY/ONCOLOGY | Facility: CLINIC | Age: 30
End: 2020-09-29

## 2020-09-29 NOTE — TELEPHONE ENCOUNTER
----- Message from Dilan Smith MD sent at 9/29/2020  3:25 PM CDT -----  Regarding: RE: hospital follow up    Yes please delete referral, I discussed the bone marrow biopsy with the patient and she does not need Hematology follow-up as her problem was infectious and not a hematological problem. This was discussed with both patient and family,   thanks!    ------------------- Message --------------  From: Yanet Cadena RN  Sent: 9/29/2020   2:10 PM CDT  To: Dilan Smith MD  Subject: hospital follow up                               Dr Smith,    You reviewed BMbx on 9/17.  Will you be instructing the schedulers to set up follow up apptmnt?   I will be deleting the referral from the navigator queue..      Thanks,  Yanet

## 2020-12-22 NOTE — ED NOTES
Pt had 2 negative covid tests done, one this am at [facility]. Pt moved to lobby to go through intake.    English

## 2024-11-26 ENCOUNTER — HOSPITAL ENCOUNTER (EMERGENCY)
Facility: HOSPITAL | Age: 34
Discharge: HOME | End: 2024-11-26
Attending: EMERGENCY MEDICINE
Payer: COMMERCIAL

## 2024-11-26 VITALS
SYSTOLIC BLOOD PRESSURE: 96 MMHG | HEART RATE: 73 BPM | OXYGEN SATURATION: 98 % | RESPIRATION RATE: 18 BRPM | HEIGHT: 62 IN | BODY MASS INDEX: 32.2 KG/M2 | TEMPERATURE: 98 F | WEIGHT: 175 LBS | DIASTOLIC BLOOD PRESSURE: 67 MMHG

## 2024-11-26 DIAGNOSIS — M54.30 SCIATICA, UNSPECIFIED LATERALITY: Primary | ICD-10-CM

## 2024-11-26 LAB
B-HCG UR QL: NEGATIVE
CTP QC/QA: YES

## 2024-11-26 PROCEDURE — 25000003 PHARM REV CODE 250

## 2024-11-26 PROCEDURE — 99285 EMERGENCY DEPT VISIT HI MDM: CPT | Mod: 25

## 2024-11-26 PROCEDURE — 96372 THER/PROPH/DIAG INJ SC/IM: CPT

## 2024-11-26 PROCEDURE — 63600175 PHARM REV CODE 636 W HCPCS

## 2024-11-26 PROCEDURE — 81025 URINE PREGNANCY TEST: CPT

## 2024-11-26 RX ORDER — LIDOCAINE 50 MG/G
1 PATCH TOPICAL DAILY
Qty: 5 PATCH | Refills: 0 | Status: SHIPPED | OUTPATIENT
Start: 2024-11-26

## 2024-11-26 RX ORDER — OXYCODONE HYDROCHLORIDE 5 MG/1
5 TABLET ORAL
Status: COMPLETED | OUTPATIENT
Start: 2024-11-26 | End: 2024-11-26

## 2024-11-26 RX ORDER — LIDOCAINE 50 MG/G
1 PATCH TOPICAL
Status: DISCONTINUED | OUTPATIENT
Start: 2024-11-26 | End: 2024-11-26 | Stop reason: HOSPADM

## 2024-11-26 RX ORDER — METHYLPREDNISOLONE SOD SUCC 125 MG
125 VIAL (EA) INJECTION
Status: COMPLETED | OUTPATIENT
Start: 2024-11-26 | End: 2024-11-26

## 2024-11-26 RX ORDER — ACETAMINOPHEN 500 MG
1000 TABLET ORAL
Status: COMPLETED | OUTPATIENT
Start: 2024-11-26 | End: 2024-11-26

## 2024-11-26 RX ORDER — KETOROLAC TROMETHAMINE 30 MG/ML
15 INJECTION, SOLUTION INTRAMUSCULAR; INTRAVENOUS
Status: COMPLETED | OUTPATIENT
Start: 2024-11-26 | End: 2024-11-26

## 2024-11-26 RX ORDER — OXYCODONE AND ACETAMINOPHEN 5; 325 MG/1; MG/1
1 TABLET ORAL EVERY 6 HOURS PRN
Qty: 13 TABLET | Refills: 0 | Status: SHIPPED | OUTPATIENT
Start: 2024-11-26

## 2024-11-26 RX ADMIN — ACETAMINOPHEN 1000 MG: 500 TABLET ORAL at 01:11

## 2024-11-26 RX ADMIN — OXYCODONE 5 MG: 5 TABLET ORAL at 03:11

## 2024-11-26 RX ADMIN — KETOROLAC TROMETHAMINE 15 MG: 30 INJECTION, SOLUTION INTRAMUSCULAR; INTRAVENOUS at 01:11

## 2024-11-26 RX ADMIN — LIDOCAINE 1 PATCH: 50 PATCH TOPICAL at 01:11

## 2024-11-26 RX ADMIN — METHYLPREDNISOLONE SODIUM SUCCINATE 125 MG: 125 INJECTION, POWDER, FOR SOLUTION INTRAMUSCULAR; INTRAVENOUS at 02:11

## 2024-11-26 NOTE — ED PROVIDER NOTES
Encounter Date: 11/26/2024       History     Chief Complaint   Patient presents with    Leg Pain     Left leg pain x multiple months. Had xray done in October. Pain has gotten worse in last 2 days.     34-year-old female with past medical history of PCOS presents to the ED regarding left low back pain radiating to leg onset 2-3 weeks.  She has been following with her primary care regarding this.  She had unremarkable x-ray 1.5 weeks ago and just started PT. she has been taking muscle relaxers with temporary relief though over the past 2 days her low back pain became significantly worse.  She is now having new onset tingling sensation to left foot and calf.  She denies any known trauma or heavy lifting.  No urinary/bowel dysfunction, saddle anesthesia, dysuria, or fevers.  No personal history of cancer or IV drug use.  She has not taken any medications today. She was prescribed prednisone though only started taking today. She is from here and visiting for [facility] holiday though currently resides in [place].    The history is provided by the patient and medical records.     Review of patient's allergies indicates:  No Known Allergies  Past Medical History:   Diagnosis Date    PCOS (polycystic ovarian syndrome)      Past Surgical History:   Procedure Laterality Date    TONSILLECTOMY      WRIST SURGERY       Family History   Problem Relation Name Age of Onset    Breast cancer Neg Hx      Colon cancer Neg Hx      Hypertension Neg Hx      Ovarian cancer Neg Hx      Stroke Neg Hx       Social History     Tobacco Use    Smoking status: Never   Substance Use Topics    Alcohol use: Yes    Drug use: No     Review of Systems  See HPI  Physical Exam     Initial Vitals [11/26/24 1212]   BP Pulse Resp Temp SpO2   104/68 (!) 112 15 98 °F (36.7 °C) 100 %      MAP       --         Physical Exam    Vitals reviewed.  Constitutional: She appears well-developed and well-nourished. She is not diaphoretic. No distress.   HENT:    Head: Normocephalic and atraumatic.   Neck: Neck supple.   Cardiovascular:  Normal rate and intact distal pulses.           Pulmonary/Chest: No respiratory distress.   Musculoskeletal:      Cervical back: Neck supple.      Lumbar back: No tenderness or bony tenderness. Decreased range of motion. Positive left straight leg raise test.      Comments: No tenderness to palpation along L-spine or paraspinal muscles though pain elicited with back flexion.  Mild decreased range of motion of left hip secondary to back pain.  No tenderness to palpation along her left leg.  2+ DP and TP pulses     Neurological: She is alert and oriented to person, place, and time. She has normal strength. No sensory deficit.   5/5 strength to left leg with hip flexion, knee extension, dorsiflexion, and plantar flexion.  Subjective decreased sensation of left calf and foot though intact sensations on my exam   Psychiatric: She has a normal mood and affect.         ED Course   Procedures  Labs Reviewed   POCT URINE PREGNANCY       Result Value    POC Preg Test, Ur Negative       Acceptable Yes            Imaging Results              CT Lumbar Spine Without Contrast (Final result)  Result time 11/26/24 17:00:51      Final result by Gonzalo Grigsby MD (11/26/24 17:00:51)                   Impression:      Degenerative changes of the lumbar spine most pronounced at L5-S1 with moderate to severe spinal canal stenosis.    Electronically signed by resident: Caro Hernandez  Date:    11/26/2024  Time:    16:04    Electronically signed by: Gonzalo Grigsby MD  Date:    11/26/2024  Time:    17:00               Narrative:    EXAMINATION:  CT LUMBAR SPINE WITHOUT CONTRAST    CLINICAL HISTORY:  Low back pain, progressive neurologic deficit;new onset paresthesias along dermatome L5-S1;    TECHNIQUE:  Low-dose axial, sagittal and coronal reformations are obtained through the lumbar spine.  Contrast was not administered.    COMPARISON:  CT chest  abdomen pelvis 09/10/2020    FINDINGS:  Alignment: Normal.    Vertebrae: No fracture. No lytic or blastic lesion.    Discs: Mild disc height loss T11-T12, L5-S1.    Sacroiliac joints: Normal.    Degenerative findings: There is multilevel calcification of the ligamentum flavum throughout the lumbar spine.    T12-L1: Mild facet arthropathy results in mild bilateral neural foraminal narrowing.    L1-L2: Circumferential disc bulge and mild facet arthropathy result in mild left neural foraminal narrowing.    L2-L3: Circumferential disc bulge and mild facet arthropathy result in moderate right neural foraminal narrowing.    L3-L4: Circumferential disc bulge and moderate facet arthropathy result in mild spinal canal stenosis.    L4-L5: Circumferential disc bulge and moderate facet arthropathy result in mild spinal canal stenosis.    L5-S1: Circumferential disc bulge with osteophyte production and mild facet arthropathy result in moderate to severe spinal canal stenosis and mild bilateral neural foraminal narrowing.    Paraspinal muscles & soft tissues: Unremarkable.    Paraspinal muscles & soft tissues: Cholecystectomy.  Intrauterine contraceptive device.  Moderate formed stool throughout the colon.                                       Medications   ketorolac injection 15 mg (15 mg Intramuscular Given 11/26/24 1357)   acetaminophen tablet 1,000 mg (1,000 mg Oral Given 11/26/24 1357)   methylPREDNISolone sodium succinate injection 125 mg (125 mg Intramuscular Given 11/26/24 1424)   oxyCODONE immediate release tablet 5 mg (5 mg Oral Given 11/26/24 1748)     Medical Decision Making  Emergent evaluation of 34-year-old female regarding left low back/leg pain onset few weeks though significantly worse over the 2 days.  No red flags.  Mildly tachycardic with other vitals stable.  Lower extremities neurovascularly intact. Will consider imaging and provide analgesia and reassess.    My differential diagnoses include but are not  limited to:  Lumbar radiculopathy, sciatica, muscle strain, doubt cauda equina syndrome or diskitis  See ED course.  I have reviewed the patient's records and discussed with my supervising physician.    Amount and/or Complexity of Data Reviewed  Labs: ordered. Decision-making details documented in ED Course.  Radiology: ordered.    Risk  OTC drugs.  Prescription drug management.               ED Course as of 11/27/24 0713   Tue Nov 26, 2024   1403 hCG Qualitative, Urine: Negative [KB]   1443 Called CT tech to cancel the imaging who verbalized understanding and would not come get patient for imaging. Patient was taken to imaging anyway though directed not to [KB]   1554 Patient's pain only minimally relieved, will try oxycodone.  Advised patient if no acute abnormality seen on imaging she should call her doctor back at home for immediate follow up an outpatient MRI.  Do not think emergent MRI is indicated at this time though she will need 1 more urgently with a new paresthesias.  Prescribed few doses of Percocet for breakthrough pain. Strict return precautions discussed. [KB]      ED Course User Index  [KB] Emiliana Tian PA-C                             Clinical Impression:  Final diagnoses:  [M54.30] Sciatica, unspecified laterality (Primary)          ED Disposition Condition    Discharge Stable          ED Prescriptions       Medication Sig Dispense Start Date End Date Auth. Provider    oxyCODONE-acetaminophen (PERCOCET) 5-325 mg per tablet Take 1 tablet by mouth every 6 (six) hours as needed for Pain. 13 tablet 11/26/2024 -- Carlos Peraza III, MD    LIDOcaine (LIDODERM) 5 % Place 1 patch onto the skin once daily. Remove & Discard patch within 12 hours or as directed by MD 5 patch 11/26/2024 -- Solo Loera MD          Follow-up Information       Follow up With Specialties Details Why Contact Info    [facility] - Emergency Dept Emergency Medicine Go to  If symptoms worsen [address]  [place]  [facility] [id number]  [phone]             Emiliana Tian PA-C  11/27/24 0719

## 2024-11-26 NOTE — ED NOTES
Patient comes into the emergency department by POV with complaints of left leg numbness and tingling. Patient states that she first noticed the symptoms when she woke up today. Patient also complains of sharp lower back pain, she states that the back pain is what is radiating to cause the leg pain.  LOC: The patient is awake, alert and aware of environment with an appropriate affect, the patient is oriented x 3 and speaking appropriately.   APPEARANCE: Patient appears comfortable and in no acute distress, patient is clean and well groomed.  SKIN: The skin is warm and dry, color consistent with ethnicity, patient has normal skin turgor and moist mucus membranes, skin intact, no breakdown or bruising noted.   MUSCULOSKELETAL: Patient moving all extremities spontaneously, no swelling noted. Pt complains of lower back pain that radiates down her leg. Experiencing numbness and tingling in left leg.  RESPIRATORY: Airway is open and patent, respirations are spontaneous, patient has a normal effort and rate, no accessory muscle.  CARDIAC: Pt placed on cardiac monitor. Patient has a normal rate and regular rhythm, no edema noted, capillary refill < 3 seconds.   GASTRO: Soft and non tender to palpation, no distention noted.  : Pt denies any pain or frequency with urination.  NEURO: Pt opens eyes spontaneously, behavior appropriate to situation, follows commands, facial expression symmetrical, bilateral hand grasp equal and even, purposeful motor response noted, normal sensation in all extremities when touched with a finger.